# Patient Record
Sex: FEMALE | Race: WHITE | NOT HISPANIC OR LATINO | URBAN - METROPOLITAN AREA
[De-identification: names, ages, dates, MRNs, and addresses within clinical notes are randomized per-mention and may not be internally consistent; named-entity substitution may affect disease eponyms.]

---

## 2023-02-23 LAB
AMPHETAMINE (PRESENCE) IN URINE BY SCREEN METHOD: NORMAL
BARBITURATES PRESENCE IN URINE BY SCREEN METHOD: NORMAL
BENZODIAZEPINE (PRESENCE) IN URINE BY SCREEN METHOD: NORMAL
CANNABINOIDS IN URINE BY SCREEN METHOD: NORMAL
COCAINE (PRESENCE) IN URINE BY SCREEN METHOD: NORMAL
DRUG SCREEN COMMENT URINE: NORMAL
FENTANYL URINE: NORMAL
METHADONE (PRESENCE) IN URINE BY SCREEN METHOD: NORMAL
OPIATES (PRESENCE) IN URINE BY SCREEN METHOD: NORMAL
OXYCODONE (PRESENCE) IN URINE BY SCREEN METHOD: NORMAL
PHENCYCLIDINE (PRESENCE) IN URINE BY SCREEN METHOD: NORMAL

## 2023-04-03 ENCOUNTER — TELEPHONE (OUTPATIENT)
Dept: PRIMARY CARE | Facility: CLINIC | Age: 78
End: 2023-04-03
Payer: MEDICARE

## 2023-04-03 NOTE — TELEPHONE ENCOUNTER
Patient is 10 days out from having covid and is still experiencing coughing up phlegm. She would like to be advised on if she should just ride it out or if she needs to come in to be seen or be sent in medication.    [facility] [phone]    Please call and advise patient [phone]    Left detailed voicemail for patient RMB sent in medication.

## 2023-04-04 DIAGNOSIS — R05.8 UPPER AIRWAY COUGH SYNDROME: Primary | ICD-10-CM

## 2023-04-04 RX ORDER — BENZONATATE 200 MG/1
200 CAPSULE ORAL 3 TIMES DAILY PRN
Qty: 30 CAPSULE | Refills: 0 | Status: SHIPPED | OUTPATIENT
Start: 2023-04-04 | End: 2023-05-04

## 2023-04-04 RX ORDER — ALBUTEROL SULFATE 90 UG/1
1 AEROSOL, METERED RESPIRATORY (INHALATION) EVERY 4 HOURS PRN
Qty: 8.5 G | Refills: 0 | Status: SHIPPED | OUTPATIENT
Start: 2023-04-04 | End: 2023-12-10 | Stop reason: ALTCHOICE

## 2023-05-19 ENCOUNTER — OFFICE VISIT (OUTPATIENT)
Dept: PRIMARY CARE | Facility: CLINIC | Age: 78
End: 2023-05-19
Payer: MEDICARE

## 2023-05-19 ENCOUNTER — LAB (OUTPATIENT)
Dept: LAB | Facility: LAB | Age: 78
End: 2023-05-19
Payer: MEDICARE

## 2023-05-19 VITALS
WEIGHT: 175.6 LBS | HEART RATE: 69 BPM | RESPIRATION RATE: 18 BRPM | OXYGEN SATURATION: 95 % | BODY MASS INDEX: 30.14 KG/M2

## 2023-05-19 DIAGNOSIS — R00.2 PALPITATIONS: ICD-10-CM

## 2023-05-19 DIAGNOSIS — M79.10 MUSCLE ACHE: ICD-10-CM

## 2023-05-19 DIAGNOSIS — Z00.00 ANNUAL PHYSICAL EXAM: Primary | ICD-10-CM

## 2023-05-19 DIAGNOSIS — Z00.00 ANNUAL PHYSICAL EXAM: ICD-10-CM

## 2023-05-19 DIAGNOSIS — R00.2 PALPITATIONS: Primary | ICD-10-CM

## 2023-05-19 PROBLEM — R11.0 NAUSEA: Status: ACTIVE | Noted: 2023-05-19

## 2023-05-19 PROBLEM — I10 HYPERTENSION: Status: ACTIVE | Noted: 2023-05-19

## 2023-05-19 PROBLEM — R10.9 ABDOMINAL PAIN: Status: ACTIVE | Noted: 2023-05-19

## 2023-05-19 PROBLEM — M70.60 TROCHANTERIC BURSITIS: Status: ACTIVE | Noted: 2023-05-19

## 2023-05-19 PROBLEM — R73.9 ELEVATED BLOOD SUGAR: Status: ACTIVE | Noted: 2023-05-19

## 2023-05-19 PROBLEM — T14.8XXA MUSCLE STRAIN: Status: ACTIVE | Noted: 2023-05-19

## 2023-05-19 PROBLEM — N20.0 CALCULUS OF KIDNEY: Status: ACTIVE | Noted: 2023-05-19

## 2023-05-19 PROBLEM — K11.1 PAROTID GLAND ENLARGEMENT: Status: ACTIVE | Noted: 2023-05-19

## 2023-05-19 PROBLEM — M54.16 LUMBAR NEURITIS: Status: ACTIVE | Noted: 2023-05-19

## 2023-05-19 PROBLEM — N39.0 UTI (URINARY TRACT INFECTION): Status: ACTIVE | Noted: 2023-05-19

## 2023-05-19 PROBLEM — R35.0 URINARY FREQUENCY: Status: ACTIVE | Noted: 2023-05-19

## 2023-05-19 PROBLEM — R22.1 NECK SWELLING: Status: ACTIVE | Noted: 2023-05-19

## 2023-05-19 PROBLEM — M85.80 OSTEOPENIA: Status: ACTIVE | Noted: 2023-05-19

## 2023-05-19 PROBLEM — J34.89 NOSE DRYNESS: Status: ACTIVE | Noted: 2023-05-19

## 2023-05-19 PROBLEM — G47.00 INSOMNIA: Status: ACTIVE | Noted: 2023-05-19

## 2023-05-19 PROBLEM — E21.0 PRIMARY HYPERPARATHYROIDISM (MULTI): Status: ACTIVE | Noted: 2023-05-19

## 2023-05-19 PROBLEM — N20.1 URETERAL STONE: Status: ACTIVE | Noted: 2023-05-19

## 2023-05-19 PROBLEM — R05.9 COUGH: Status: ACTIVE | Noted: 2023-05-19

## 2023-05-19 PROBLEM — R04.0 EPISTAXIS: Status: ACTIVE | Noted: 2023-05-19

## 2023-05-19 PROBLEM — L91.8 SKIN TAG: Status: ACTIVE | Noted: 2023-05-19

## 2023-05-19 PROBLEM — N95.2 VAGINAL ATROPHY: Status: ACTIVE | Noted: 2023-05-19

## 2023-05-19 PROBLEM — I49.3 PVC (PREMATURE VENTRICULAR CONTRACTION): Status: ACTIVE | Noted: 2023-05-19

## 2023-05-19 PROBLEM — R07.81 RIB PAIN ON LEFT SIDE: Status: ACTIVE | Noted: 2023-05-19

## 2023-05-19 PROBLEM — J34.89 NASAL DRAINAGE: Status: ACTIVE | Noted: 2023-05-19

## 2023-05-19 PROBLEM — J01.90 ACUTE SINUSITIS: Status: ACTIVE | Noted: 2023-05-19

## 2023-05-19 PROBLEM — R20.0 LEFT LEG NUMBNESS: Status: ACTIVE | Noted: 2023-05-19

## 2023-05-19 PROBLEM — E78.5 HYPERLIPIDEMIA: Status: ACTIVE | Noted: 2023-05-19

## 2023-05-19 PROBLEM — N36.2 URETHRAL CARUNCLE: Status: ACTIVE | Noted: 2023-05-19

## 2023-05-19 PROBLEM — M81.0 OSTEOPOROSIS: Status: ACTIVE | Noted: 2023-05-19

## 2023-05-19 PROBLEM — E83.52 HYPERCALCEMIA: Status: ACTIVE | Noted: 2023-05-19

## 2023-05-19 PROBLEM — K21.9 GERD (GASTROESOPHAGEAL REFLUX DISEASE): Status: ACTIVE | Noted: 2023-05-19

## 2023-05-19 PROBLEM — M54.12 CERVICAL NEURITIS: Status: ACTIVE | Noted: 2023-05-19

## 2023-05-19 PROBLEM — M19.90 OSTEOARTHRITIS: Status: ACTIVE | Noted: 2023-05-19

## 2023-05-19 LAB
ALANINE AMINOTRANSFERASE (SGPT) (U/L) IN SER/PLAS: 16 U/L (ref 7–45)
ALBUMIN (G/DL) IN SER/PLAS: 4.5 G/DL (ref 3.4–5)
ALKALINE PHOSPHATASE (U/L) IN SER/PLAS: 48 U/L (ref 33–136)
ANION GAP IN SER/PLAS: 10 MMOL/L (ref 10–20)
ASPARTATE AMINOTRANSFERASE (SGOT) (U/L) IN SER/PLAS: 17 U/L (ref 9–39)
BASOPHILS (10*3/UL) IN BLOOD BY AUTOMATED COUNT: 0.04 X10E9/L (ref 0–0.1)
BASOPHILS/100 LEUKOCYTES IN BLOOD BY AUTOMATED COUNT: 0.6 % (ref 0–2)
BILIRUBIN TOTAL (MG/DL) IN SER/PLAS: 0.6 MG/DL (ref 0–1.2)
CALCIUM (MG/DL) IN SER/PLAS: 10 MG/DL (ref 8.6–10.3)
CARBON DIOXIDE, TOTAL (MMOL/L) IN SER/PLAS: 27 MMOL/L (ref 21–32)
CHLORIDE (MMOL/L) IN SER/PLAS: 104 MMOL/L (ref 98–107)
CREATINE KINASE (U/L) IN SER/PLAS: 48 U/L (ref 0–215)
CREATININE (MG/DL) IN SER/PLAS: 0.99 MG/DL (ref 0.5–1.05)
EOSINOPHILS (10*3/UL) IN BLOOD BY AUTOMATED COUNT: 0.05 X10E9/L (ref 0–0.4)
EOSINOPHILS/100 LEUKOCYTES IN BLOOD BY AUTOMATED COUNT: 0.8 % (ref 0–6)
ERYTHROCYTE DISTRIBUTION WIDTH (RATIO) BY AUTOMATED COUNT: 11.9 % (ref 11.5–14.5)
ERYTHROCYTE MEAN CORPUSCULAR HEMOGLOBIN CONCENTRATION (G/DL) BY AUTOMATED: 32.5 G/DL (ref 32–36)
ERYTHROCYTE MEAN CORPUSCULAR VOLUME (FL) BY AUTOMATED COUNT: 90 FL (ref 80–100)
ERYTHROCYTES (10*6/UL) IN BLOOD BY AUTOMATED COUNT: 5.31 X10E12/L (ref 4–5.2)
GFR FEMALE: 58 ML/MIN/1.73M2
GLUCOSE (MG/DL) IN SER/PLAS: 89 MG/DL (ref 74–99)
HEMATOCRIT (%) IN BLOOD BY AUTOMATED COUNT: 47.7 % (ref 36–46)
HEMOGLOBIN (G/DL) IN BLOOD: 15.5 G/DL (ref 12–16)
IMMATURE GRANULOCYTES/100 LEUKOCYTES IN BLOOD BY AUTOMATED COUNT: 0.5 % (ref 0–0.9)
LEUKOCYTES (10*3/UL) IN BLOOD BY AUTOMATED COUNT: 6.3 X10E9/L (ref 4.4–11.3)
LYMPHOCYTES (10*3/UL) IN BLOOD BY AUTOMATED COUNT: 1.61 X10E9/L (ref 0.8–3)
LYMPHOCYTES/100 LEUKOCYTES IN BLOOD BY AUTOMATED COUNT: 25.5 % (ref 13–44)
MONOCYTES (10*3/UL) IN BLOOD BY AUTOMATED COUNT: 0.51 X10E9/L (ref 0.05–0.8)
MONOCYTES/100 LEUKOCYTES IN BLOOD BY AUTOMATED COUNT: 8.1 % (ref 2–10)
NEUTROPHILS (10*3/UL) IN BLOOD BY AUTOMATED COUNT: 4.08 X10E9/L (ref 1.6–5.5)
NEUTROPHILS/100 LEUKOCYTES IN BLOOD BY AUTOMATED COUNT: 64.5 % (ref 40–80)
PLATELETS (10*3/UL) IN BLOOD AUTOMATED COUNT: 378 X10E9/L (ref 150–450)
POTASSIUM (MMOL/L) IN SER/PLAS: 4.4 MMOL/L (ref 3.5–5.3)
PROTEIN TOTAL: 7 G/DL (ref 6.4–8.2)
SEDIMENTATION RATE, ERYTHROCYTE: 21 MM/H (ref 0–30)
SODIUM (MMOL/L) IN SER/PLAS: 137 MMOL/L (ref 136–145)
THYROTROPIN (MIU/L) IN SER/PLAS BY DETECTION LIMIT <= 0.05 MIU/L: 1.62 MIU/L (ref 0.44–3.98)
UREA NITROGEN (MG/DL) IN SER/PLAS: 24 MG/DL (ref 6–23)

## 2023-05-19 PROCEDURE — 93000 ELECTROCARDIOGRAM COMPLETE: CPT | Performed by: INTERNAL MEDICINE

## 2023-05-19 PROCEDURE — 1126F AMNT PAIN NOTED NONE PRSNT: CPT | Performed by: INTERNAL MEDICINE

## 2023-05-19 PROCEDURE — 1036F TOBACCO NON-USER: CPT | Performed by: INTERNAL MEDICINE

## 2023-05-19 PROCEDURE — 84443 ASSAY THYROID STIM HORMONE: CPT

## 2023-05-19 PROCEDURE — 36415 COLL VENOUS BLD VENIPUNCTURE: CPT

## 2023-05-19 PROCEDURE — 82550 ASSAY OF CK (CPK): CPT

## 2023-05-19 PROCEDURE — 85652 RBC SED RATE AUTOMATED: CPT

## 2023-05-19 PROCEDURE — 1160F RVW MEDS BY RX/DR IN RCRD: CPT | Performed by: INTERNAL MEDICINE

## 2023-05-19 PROCEDURE — 1159F MED LIST DOCD IN RCRD: CPT | Performed by: INTERNAL MEDICINE

## 2023-05-19 PROCEDURE — 85025 COMPLETE CBC W/AUTO DIFF WBC: CPT

## 2023-05-19 PROCEDURE — 99214 OFFICE O/P EST MOD 30 MIN: CPT | Performed by: INTERNAL MEDICINE

## 2023-05-19 PROCEDURE — 80053 COMPREHEN METABOLIC PANEL: CPT

## 2023-05-19 RX ORDER — AMLODIPINE BESYLATE 5 MG/1
1 TABLET ORAL DAILY
COMMUNITY
Start: 2011-11-28 | End: 2023-10-26 | Stop reason: SDUPTHER

## 2023-05-19 RX ORDER — MELOXICAM 15 MG/1
1 TABLET ORAL DAILY
COMMUNITY
Start: 2012-01-26 | End: 2024-01-11 | Stop reason: WASHOUT

## 2023-05-19 RX ORDER — OMEPRAZOLE 20 MG/1
1 CAPSULE, DELAYED RELEASE ORAL 2 TIMES DAILY
COMMUNITY
Start: 2021-12-06 | End: 2024-01-11 | Stop reason: WASHOUT

## 2023-05-19 RX ORDER — METOPROLOL SUCCINATE 50 MG/1
1 TABLET, EXTENDED RELEASE ORAL DAILY
COMMUNITY
Start: 2011-12-16 | End: 2023-10-26 | Stop reason: SDUPTHER

## 2023-05-19 RX ORDER — ATORVASTATIN CALCIUM 20 MG/1
1 TABLET, FILM COATED ORAL DAILY
COMMUNITY
Start: 2011-09-27 | End: 2023-05-26 | Stop reason: SDUPTHER

## 2023-05-19 RX ORDER — ZOLPIDEM TARTRATE 10 MG/1
TABLET ORAL
COMMUNITY
Start: 2013-03-05 | End: 2023-08-08 | Stop reason: SDUPTHER

## 2023-05-19 RX ORDER — ALENDRONATE SODIUM 70 MG/1
TABLET ORAL
COMMUNITY
Start: 2022-02-03 | End: 2023-10-09

## 2023-05-19 ASSESSMENT — PATIENT HEALTH QUESTIONNAIRE - PHQ9
2. FEELING DOWN, DEPRESSED OR HOPELESS: NOT AT ALL
SUM OF ALL RESPONSES TO PHQ9 QUESTIONS 1 AND 2: 0
1. LITTLE INTEREST OR PLEASURE IN DOING THINGS: NOT AT ALL

## 2023-05-19 ASSESSMENT — ENCOUNTER SYMPTOMS
DEPRESSION: 0
OCCASIONAL FEELINGS OF UNSTEADINESS: 0
LOSS OF SENSATION IN FEET: 0

## 2023-05-19 NOTE — PROGRESS NOTES
Subjective   Patient ID: Yosvany Uriarte is a 77 y.o. female who presents for Palpitations, Arm Pain (Left arm burning), and Chest Pain (pressure).    HPI     Palpitations-previously seen by Dr Shanks in cardiology in 2015- Stress test and Holter were normal.    Intermittent chest pressure    L arm pressure/aching x 2 weeks    Review of Systems      No Fever/chills/headaches/dizziness/ shortness of breath/Nausea/vomiting/diarrhea/ constipation/urine frequency/blood in urine.      Objective   Pulse 69   Resp 18   Wt 79.7 kg (175 lb 9.6 oz)   SpO2 95%   BMI 30.14 kg/m²     Physical Exam    No JVP elevation. No palpable Lymph Nodes. No Thyromegaly    CVS-NL S1/S2 . No MRG    Lungs-CTA. B/S= B/L    Abdomen-Soft, Non-tender. No masses or HSM    Extremities: No C/C/E      Assessment/Plan        Palpitations-previously seen by Dr Shanks in cardiology in 2015- Stress test and Holter were normal.    Intermittent chest pressure    L arm pressure/aching x 2 weeks    Plan:    EKG    Labs    Echo    Zio patch/Monitor    ? Increase Metoprolol from 50-75 mg daily    L arm pressure/aching x 2 weeks    ? Muscular vs Neuropathic    ? Medrol dose Pk    Follow up PRN if symptoms persist or worsen

## 2023-05-26 DIAGNOSIS — E78.5 HYPERLIPIDEMIA, UNSPECIFIED HYPERLIPIDEMIA TYPE: ICD-10-CM

## 2023-05-26 RX ORDER — ATORVASTATIN CALCIUM 20 MG/1
20 TABLET, FILM COATED ORAL DAILY
Qty: 90 TABLET | Refills: 0 | Status: SHIPPED | OUTPATIENT
Start: 2023-05-26 | End: 2023-07-21

## 2023-06-01 ENCOUNTER — LAB (OUTPATIENT)
Dept: LAB | Facility: LAB | Age: 78
End: 2023-06-01
Payer: MEDICARE

## 2023-06-01 DIAGNOSIS — Z00.00 MEDICARE ANNUAL WELLNESS VISIT, SUBSEQUENT: ICD-10-CM

## 2023-06-01 LAB
APPEARANCE, URINE: ABNORMAL
BILIRUBIN, URINE: NEGATIVE
BLOOD, URINE: NEGATIVE
CHOLESTEROL (MG/DL) IN SER/PLAS: 185 MG/DL (ref 0–199)
CHOLESTEROL IN HDL (MG/DL) IN SER/PLAS: 53.5 MG/DL
CHOLESTEROL/HDL RATIO: 3.5
COLOR, URINE: YELLOW
GLUCOSE, URINE: NEGATIVE MG/DL
KETONES, URINE: NEGATIVE MG/DL
LDL: 95 MG/DL (ref 0–99)
LEUKOCYTE ESTERASE, URINE: ABNORMAL
MUCUS, URINE: NORMAL /LPF
NITRITE, URINE: NEGATIVE
PH, URINE: 5 (ref 5–8)
PROTEIN, URINE: NEGATIVE MG/DL
RBC, URINE: <1 /HPF (ref 0–5)
SPECIFIC GRAVITY, URINE: 1.02 (ref 1–1.03)
SQUAMOUS EPITHELIAL CELLS, URINE: <1 /HPF
TRIGLYCERIDE (MG/DL) IN SER/PLAS: 182 MG/DL (ref 0–149)
UROBILINOGEN, URINE: <2 MG/DL (ref 0–1.9)
VLDL: 36 MG/DL (ref 0–40)
WBC, URINE: 1 /HPF (ref 0–5)

## 2023-06-01 PROCEDURE — 80061 LIPID PANEL: CPT

## 2023-06-01 PROCEDURE — 81001 URINALYSIS AUTO W/SCOPE: CPT

## 2023-06-01 PROCEDURE — 36415 COLL VENOUS BLD VENIPUNCTURE: CPT

## 2023-06-08 ENCOUNTER — OFFICE VISIT (OUTPATIENT)
Dept: PRIMARY CARE | Facility: CLINIC | Age: 78
End: 2023-06-08
Payer: MEDICARE

## 2023-06-08 VITALS
RESPIRATION RATE: 18 BRPM | BODY MASS INDEX: 31.25 KG/M2 | SYSTOLIC BLOOD PRESSURE: 154 MMHG | DIASTOLIC BLOOD PRESSURE: 71 MMHG | OXYGEN SATURATION: 96 % | HEART RATE: 73 BPM | WEIGHT: 176.4 LBS | HEIGHT: 63 IN

## 2023-06-08 DIAGNOSIS — R00.2 PALPITATIONS: Primary | ICD-10-CM

## 2023-06-08 DIAGNOSIS — Z00.00 MEDICARE ANNUAL WELLNESS VISIT, SUBSEQUENT: ICD-10-CM

## 2023-06-08 PROCEDURE — 1160F RVW MEDS BY RX/DR IN RCRD: CPT | Performed by: INTERNAL MEDICINE

## 2023-06-08 PROCEDURE — 1036F TOBACCO NON-USER: CPT | Performed by: INTERNAL MEDICINE

## 2023-06-08 PROCEDURE — 3078F DIAST BP <80 MM HG: CPT | Performed by: INTERNAL MEDICINE

## 2023-06-08 PROCEDURE — 1159F MED LIST DOCD IN RCRD: CPT | Performed by: INTERNAL MEDICINE

## 2023-06-08 PROCEDURE — G0439 PPPS, SUBSEQ VISIT: HCPCS | Performed by: INTERNAL MEDICINE

## 2023-06-08 PROCEDURE — 3077F SYST BP >= 140 MM HG: CPT | Performed by: INTERNAL MEDICINE

## 2023-06-08 RX ORDER — VITAMIN E 268 MG
CAPSULE ORAL
COMMUNITY
Start: 2003-05-19

## 2023-06-08 RX ORDER — MULTIVITAMIN
TABLET ORAL
COMMUNITY
Start: 2003-05-19

## 2023-06-08 ASSESSMENT — ENCOUNTER SYMPTOMS
LOSS OF SENSATION IN FEET: 0
DEPRESSION: 0
OCCASIONAL FEELINGS OF UNSTEADINESS: 0

## 2023-06-08 NOTE — PROGRESS NOTES
"Subjective   Reason for Visit: Yosvany Uriarte is an 77 y.o. female here for a Medicare Wellness visit.          Reviewed all medications by prescribing practitioner or clinical pharmacist (such as prescriptions, OTCs, herbal therapies and supplements) and documented in the medical record.    HTN    HLD    C/o palpitations- Echo completed. Holter in progress    HPI    Patient Care Team:  Samuel Paul MD as PCP - General  Samuel Paul MD as PCP - [employer] PCP     Review of Systems    + Palpitations      No Fever/chills/headaches/dizziness/chest pains/ shortness of breath//Nausea/vomiting/diarrhea/ constipation/urine frequency/blood in urine.        Objective   Vitals:  /71 (BP Location: Left arm, Patient Position: Sitting, BP Cuff Size: Adult)   Pulse 73   Resp 18   Ht 1.605 m (5' 3.19\")   Wt 80 kg (176 lb 6.4 oz)   SpO2 96%   BMI 31.06 kg/m²       Physical Exam    No JVP elevation. No palpable Lymph Nodes. No Thyromegaly    CVS-NL S1/S2 . No MRG    Lungs-CTA. B/S= B/L    Abdomen-Soft, Non-tender. No masses or HSM    Extremities: No C/C/E      Assessment/Plan   Problem List Items Addressed This Visit    None  Visit Diagnoses       Medicare annual wellness visit, subsequent        Relevant Orders    Lipid Panel (Completed)    Urinalysis with Reflex Microscopic (Completed)            HTN    HLD    C/o palpitations- Echo completed. Holter in progress    Plan:     Hold Amlodipine 5 mg ( 5% may cause palpitations) x 7-10 days    Labs    Follow up if symptoms persist/worsen     "

## 2023-07-07 ENCOUNTER — TELEPHONE (OUTPATIENT)
Dept: PRIMARY CARE | Facility: CLINIC | Age: 78
End: 2023-07-07
Payer: MEDICARE

## 2023-07-07 DIAGNOSIS — R00.2 PALPITATIONS: Primary | ICD-10-CM

## 2023-07-07 NOTE — TELEPHONE ENCOUNTER
Pt is requesting a referral to cardiology for palpitations. Echo with diastolic dysfunction and mild AVR. Prior Holter monitor in 2015 -no arrhythmia

## 2023-07-10 ENCOUNTER — TELEPHONE (OUTPATIENT)
Dept: PRIMARY CARE | Facility: CLINIC | Age: 78
End: 2023-07-10
Payer: MEDICARE

## 2023-07-10 NOTE — TELEPHONE ENCOUNTER
Spoke with patient and informed her of [facility] message about her results and that he put the referral in the system. Patient was in full understanding and had no other questions or concerns. Patient was identified with full name and date of birth.       Margarita Ruelas MA

## 2023-07-20 DIAGNOSIS — E78.5 HYPERLIPIDEMIA, UNSPECIFIED HYPERLIPIDEMIA TYPE: ICD-10-CM

## 2023-07-21 RX ORDER — ATORVASTATIN CALCIUM 20 MG/1
20 TABLET, FILM COATED ORAL DAILY
Qty: 90 TABLET | Refills: 3 | Status: SHIPPED | OUTPATIENT
Start: 2023-07-21 | End: 2024-05-20 | Stop reason: SDUPTHER

## 2023-08-08 DIAGNOSIS — G47.00 INSOMNIA, UNSPECIFIED TYPE: ICD-10-CM

## 2023-08-08 RX ORDER — ZOLPIDEM TARTRATE 10 MG/1
10 TABLET ORAL NIGHTLY PRN
Qty: 30 TABLET | Refills: 2 | Status: SHIPPED | OUTPATIENT
Start: 2023-08-08 | End: 2023-10-26 | Stop reason: SDUPTHER

## 2023-08-29 ENCOUNTER — TELEPHONE (OUTPATIENT)
Dept: PRIMARY CARE | Facility: CLINIC | Age: 78
End: 2023-08-29
Payer: MEDICARE

## 2023-08-30 NOTE — TELEPHONE ENCOUNTER
Called patient and informed her that [facility] is recommending Dr. Mony Jade or one of her associates at [phone]. She was in full understanding and had no other questions or concerns.       Margarita Ruelas MA

## 2023-09-07 NOTE — TELEPHONE ENCOUNTER
PT called in and stated that the therapist  for family counceling (Dr. Jade) that she was referred to is not accepting new patients. PT needs a new referral for a different therapist

## 2023-09-07 NOTE — TELEPHONE ENCOUNTER
Called patient and left a voice mail informing her that [facility] does not have any other recommendations and said that her associates are just as good.      Margarita Ruelas MA

## 2023-10-04 DIAGNOSIS — M81.0 AGE RELATED OSTEOPOROSIS, UNSPECIFIED PATHOLOGICAL FRACTURE PRESENCE: Primary | ICD-10-CM

## 2023-10-06 PROBLEM — E83.52 SERUM CALCIUM ELEVATED: Status: ACTIVE | Noted: 2023-10-06

## 2023-10-06 PROBLEM — S52.502A DISTAL RADIUS FRACTURE, LEFT: Status: ACTIVE | Noted: 2023-10-06

## 2023-10-06 RX ORDER — METHYLPREDNISOLONE 4 MG/1
TABLET ORAL
COMMUNITY
Start: 2021-03-22 | End: 2023-12-10 | Stop reason: ALTCHOICE

## 2023-10-06 RX ORDER — MELOXICAM 7.5 MG/1
1 TABLET ORAL DAILY
COMMUNITY
Start: 2009-03-05

## 2023-10-06 RX ORDER — ASPIRIN 81 MG/1
1 TABLET ORAL DAILY
COMMUNITY
End: 2023-12-10 | Stop reason: ALTCHOICE

## 2023-10-06 RX ORDER — AMLODIPINE BESYLATE 10 MG/1
1 TABLET ORAL DAILY
COMMUNITY
Start: 2009-03-05 | End: 2024-03-28 | Stop reason: SDUPTHER

## 2023-10-06 RX ORDER — RALOXIFENE HYDROCHLORIDE 60 MG/1
TABLET, FILM COATED ORAL EVERY OTHER DAY
COMMUNITY
Start: 2003-05-19 | End: 2023-12-10 | Stop reason: ALTCHOICE

## 2023-10-06 RX ORDER — ATORVASTATIN CALCIUM 10 MG/1
1 TABLET, FILM COATED ORAL DAILY
COMMUNITY
Start: 2009-03-05 | End: 2024-01-11 | Stop reason: WASHOUT

## 2023-10-09 RX ORDER — ALENDRONATE SODIUM 70 MG/1
TABLET ORAL
Qty: 12 TABLET | Refills: 3 | Status: SHIPPED | OUTPATIENT
Start: 2023-10-09 | End: 2023-10-11 | Stop reason: SDUPTHER

## 2023-10-11 ENCOUNTER — HOSPITAL ENCOUNTER (OUTPATIENT)
Dept: RADIOLOGY | Facility: HOSPITAL | Age: 78
Discharge: HOME | End: 2023-10-11
Payer: MEDICARE

## 2023-10-11 ENCOUNTER — OFFICE VISIT (OUTPATIENT)
Dept: ORTHOPEDIC SURGERY | Facility: HOSPITAL | Age: 78
End: 2023-10-11
Payer: MEDICARE

## 2023-10-11 DIAGNOSIS — M81.0 AGE RELATED OSTEOPOROSIS, UNSPECIFIED PATHOLOGICAL FRACTURE PRESENCE: ICD-10-CM

## 2023-10-11 DIAGNOSIS — S52.592D OTHER CLOSED FRACTURE OF DISTAL END OF LEFT RADIUS WITH ROUTINE HEALING, SUBSEQUENT ENCOUNTER: ICD-10-CM

## 2023-10-11 DIAGNOSIS — S52.592D OTHER CLOSED FRACTURE OF DISTAL END OF LEFT RADIUS WITH ROUTINE HEALING, SUBSEQUENT ENCOUNTER: Primary | ICD-10-CM

## 2023-10-11 PROCEDURE — 99213 OFFICE O/P EST LOW 20 MIN: CPT | Performed by: PHYSICIAN ASSISTANT

## 2023-10-11 PROCEDURE — 73110 X-RAY EXAM OF WRIST: CPT | Mod: LEFT SIDE | Performed by: STUDENT IN AN ORGANIZED HEALTH CARE EDUCATION/TRAINING PROGRAM

## 2023-10-11 PROCEDURE — 73110 X-RAY EXAM OF WRIST: CPT | Mod: LT,FY

## 2023-10-11 PROCEDURE — 1160F RVW MEDS BY RX/DR IN RCRD: CPT | Performed by: PHYSICIAN ASSISTANT

## 2023-10-11 PROCEDURE — 1126F AMNT PAIN NOTED NONE PRSNT: CPT | Performed by: PHYSICIAN ASSISTANT

## 2023-10-11 PROCEDURE — 1036F TOBACCO NON-USER: CPT | Performed by: PHYSICIAN ASSISTANT

## 2023-10-11 PROCEDURE — 1159F MED LIST DOCD IN RCRD: CPT | Performed by: PHYSICIAN ASSISTANT

## 2023-10-12 RX ORDER — ALENDRONATE SODIUM 70 MG/1
70 TABLET ORAL
Qty: 12 TABLET | Refills: 3 | Status: SHIPPED | OUTPATIENT
Start: 2023-10-12

## 2023-10-12 NOTE — PROGRESS NOTES
Yosvany returns to clinic today for follow-up of left distal radius fracture that occurred on 9/14/2023.  Overall she is doing well having some mild/moderate amount of pain with certain movements.    Past medical history, medications, allergies, surgical history and review of systems have been reviewed with the patient. Pertinent changes are documented in the HPI. Otherwise they are unchanged when compared to last visit on 9/28/2023.    Physical Examination Findings:  Constitutional: Appears well-developed and well-nourished.  Head: Normocephalic and atraumatic.  Eyes: Pupils are equal and round.  Cardiovascular: Intact distal pulses.   Respiratory: Effort normal. No respiratory distress.  Neurologic: Alert and oriented to person, place, and time.  Skin: Skin is warm and dry.  Hematologic / Lymphatic: No lymphedema, lymphangitis.  Psychiatric: normal mood and affect. Behavior is normal.   Musculoskeletal: Left wrist with significant visible swelling.  Full digital finger range of motion.  Good forearm rotation.    New x-rays taken today of the left wrist reveal good maintained anatomic alignment, evidence of new bridging callus formation seen    Impression: Left distal radius fracture    Plan: At this time we discussed transition back into her removable brace.  She may come out of this brace for light range of motion activity.  She will avoid any heavy lifting or weightbearing with the left upper extremity.  She will return to our office in approximately 4 weeks for repeat x-ray of the left wrist.    Mary Carmen Mendiola PA-C  Department of Orthopaedic Surgery  [facility]    Dictation performed with the use of voice recognition software. Syntax and grammatical errors may exist.

## 2023-10-26 ENCOUNTER — ANCILLARY PROCEDURE (OUTPATIENT)
Dept: RADIOLOGY | Facility: CLINIC | Age: 78
End: 2023-10-26
Payer: MEDICARE

## 2023-10-26 DIAGNOSIS — R73.9 ELEVATED BLOOD SUGAR: ICD-10-CM

## 2023-10-26 DIAGNOSIS — I10 HYPERTENSION, UNSPECIFIED TYPE: ICD-10-CM

## 2023-10-26 DIAGNOSIS — G47.00 INSOMNIA, UNSPECIFIED TYPE: ICD-10-CM

## 2023-10-26 DIAGNOSIS — E78.5 HYPERLIPIDEMIA, UNSPECIFIED: ICD-10-CM

## 2023-10-26 PROCEDURE — 75571 CT HRT W/O DYE W/CA TEST: CPT

## 2023-10-26 RX ORDER — AMLODIPINE BESYLATE 5 MG/1
5 TABLET ORAL DAILY
Qty: 90 TABLET | Refills: 3 | Status: SHIPPED | OUTPATIENT
Start: 2023-10-26 | End: 2023-12-10 | Stop reason: ALTCHOICE

## 2023-10-26 RX ORDER — ZOLPIDEM TARTRATE 10 MG/1
10 TABLET ORAL NIGHTLY PRN
Qty: 90 TABLET | Refills: 1 | Status: SHIPPED | OUTPATIENT
Start: 2023-10-26 | End: 2024-03-12 | Stop reason: SDUPTHER

## 2023-10-26 RX ORDER — METOPROLOL SUCCINATE 50 MG/1
50 TABLET, EXTENDED RELEASE ORAL DAILY
Qty: 90 TABLET | Refills: 3 | Status: SHIPPED | OUTPATIENT
Start: 2023-10-26 | End: 2024-10-25

## 2023-11-08 DIAGNOSIS — S52.592D OTHER CLOSED FRACTURE OF DISTAL END OF LEFT RADIUS WITH ROUTINE HEALING, SUBSEQUENT ENCOUNTER: Primary | ICD-10-CM

## 2023-11-09 ENCOUNTER — OFFICE VISIT (OUTPATIENT)
Dept: ORTHOPEDIC SURGERY | Facility: CLINIC | Age: 78
End: 2023-11-09
Payer: MEDICARE

## 2023-11-09 ENCOUNTER — ANCILLARY PROCEDURE (OUTPATIENT)
Dept: RADIOLOGY | Facility: CLINIC | Age: 78
End: 2023-11-09
Payer: MEDICARE

## 2023-11-09 DIAGNOSIS — S52.592D OTHER CLOSED FRACTURE OF DISTAL END OF LEFT RADIUS WITH ROUTINE HEALING, SUBSEQUENT ENCOUNTER: ICD-10-CM

## 2023-11-09 DIAGNOSIS — S52.592D OTHER CLOSED FRACTURE OF DISTAL END OF LEFT RADIUS WITH ROUTINE HEALING, SUBSEQUENT ENCOUNTER: Primary | ICD-10-CM

## 2023-11-09 PROCEDURE — 73110 X-RAY EXAM OF WRIST: CPT | Mod: LT,FY

## 2023-11-09 PROCEDURE — 3077F SYST BP >= 140 MM HG: CPT | Performed by: PHYSICIAN ASSISTANT

## 2023-11-09 PROCEDURE — 99213 OFFICE O/P EST LOW 20 MIN: CPT | Performed by: PHYSICIAN ASSISTANT

## 2023-11-09 PROCEDURE — 3079F DIAST BP 80-89 MM HG: CPT | Performed by: PHYSICIAN ASSISTANT

## 2023-11-09 PROCEDURE — 1126F AMNT PAIN NOTED NONE PRSNT: CPT | Performed by: PHYSICIAN ASSISTANT

## 2023-11-09 PROCEDURE — 1160F RVW MEDS BY RX/DR IN RCRD: CPT | Performed by: PHYSICIAN ASSISTANT

## 2023-11-09 PROCEDURE — 1036F TOBACCO NON-USER: CPT | Performed by: PHYSICIAN ASSISTANT

## 2023-11-09 PROCEDURE — 73110 X-RAY EXAM OF WRIST: CPT | Mod: LEFT SIDE | Performed by: RADIOLOGY

## 2023-11-09 PROCEDURE — 1159F MED LIST DOCD IN RCRD: CPT | Performed by: PHYSICIAN ASSISTANT

## 2023-11-09 ASSESSMENT — PAIN - FUNCTIONAL ASSESSMENT: PAIN_FUNCTIONAL_ASSESSMENT: NO/DENIES PAIN

## 2023-11-09 NOTE — PROGRESS NOTES
Yosvany returns to clinic today for follow-up of left distal radius fracture that occurred on 9/14/2023.  Overall she is doing well without significant discomfort.    Past medical history, medications, allergies, surgical history and review of systems have been reviewed with the patient. Pertinent changes are documented in the HPI. Otherwise they are unchanged when compared to last visit     Physical Examination Findings:  Constitutional: Appears well-developed and well-nourished.  Head: Normocephalic and atraumatic.  Eyes: Pupils are equal and round.  Cardiovascular: Intact distal pulses.   Respiratory: Effort normal. No respiratory distress.  Neurologic: Alert and oriented to person, place, and time.  Skin: Skin is warm and dry.  Hematologic / Lymphatic: No lymphedema, lymphangitis.  Psychiatric: normal mood and affect. Behavior is normal.   Musculoskeletal: Left wrist without significant visible swelling.  Full digital finger range of motion.  Good forearm rotation.  Full wrist flexion and extension compared to contralateral side.  Very minimal tenderness patient of the distal radius    New x-rays taken today of the left wrist reveal good maintained near anatomic alignment, significant bridging callus formation visible.    Impression: Left distal radius fracture    Plan: Overall her x-rays look excellent we will allow her to slowly start to progress her activity and return back to normal activity as she tolerates.  She may discontinue use of the brace as she tolerates this well.  We will allow her to follow-up with our office as needed.    Mary Carmen Mendiola PA-C  Department of Orthopaedic Surgery  [facility]    Dictation performed with the use of voice recognition software. Syntax and grammatical errors may exist.

## 2023-11-30 ENCOUNTER — OFFICE VISIT (OUTPATIENT)
Dept: PRIMARY CARE | Facility: CLINIC | Age: 78
End: 2023-11-30
Payer: MEDICARE

## 2023-11-30 DIAGNOSIS — Z23 ENCOUNTER FOR IMMUNIZATION: Primary | ICD-10-CM

## 2023-11-30 PROCEDURE — 90677 PCV20 VACCINE IM: CPT | Performed by: INTERNAL MEDICINE

## 2023-11-30 PROCEDURE — 1036F TOBACCO NON-USER: CPT | Performed by: INTERNAL MEDICINE

## 2023-11-30 PROCEDURE — G0009 ADMIN PNEUMOCOCCAL VACCINE: HCPCS | Performed by: INTERNAL MEDICINE

## 2023-11-30 PROCEDURE — 1160F RVW MEDS BY RX/DR IN RCRD: CPT | Performed by: INTERNAL MEDICINE

## 2023-11-30 PROCEDURE — 1159F MED LIST DOCD IN RCRD: CPT | Performed by: INTERNAL MEDICINE

## 2023-11-30 PROCEDURE — 1126F AMNT PAIN NOTED NONE PRSNT: CPT | Performed by: INTERNAL MEDICINE

## 2024-01-06 ENCOUNTER — PATIENT MESSAGE (OUTPATIENT)
Dept: ORTHOPEDIC SURGERY | Facility: CLINIC | Age: 79
End: 2024-01-06

## 2024-01-08 ENCOUNTER — OFFICE VISIT (OUTPATIENT)
Dept: OBSTETRICS AND GYNECOLOGY | Facility: CLINIC | Age: 79
End: 2024-01-08
Payer: MEDICARE

## 2024-01-08 VITALS — WEIGHT: 182 LBS | DIASTOLIC BLOOD PRESSURE: 73 MMHG | BODY MASS INDEX: 31.24 KG/M2 | SYSTOLIC BLOOD PRESSURE: 199 MMHG

## 2024-01-08 DIAGNOSIS — N39.9 URINARY DISORDER: Primary | ICD-10-CM

## 2024-01-08 DIAGNOSIS — Z12.31 VISIT FOR SCREENING MAMMOGRAM: ICD-10-CM

## 2024-01-08 DIAGNOSIS — S52.592D OTHER CLOSED FRACTURE OF DISTAL END OF LEFT RADIUS WITH ROUTINE HEALING, SUBSEQUENT ENCOUNTER: Primary | ICD-10-CM

## 2024-01-08 PROCEDURE — 99213 OFFICE O/P EST LOW 20 MIN: CPT | Performed by: OBSTETRICS & GYNECOLOGY

## 2024-01-08 PROCEDURE — 3078F DIAST BP <80 MM HG: CPT | Performed by: OBSTETRICS & GYNECOLOGY

## 2024-01-08 PROCEDURE — 1160F RVW MEDS BY RX/DR IN RCRD: CPT | Performed by: OBSTETRICS & GYNECOLOGY

## 2024-01-08 PROCEDURE — 1159F MED LIST DOCD IN RCRD: CPT | Performed by: OBSTETRICS & GYNECOLOGY

## 2024-01-08 PROCEDURE — 3077F SYST BP >= 140 MM HG: CPT | Performed by: OBSTETRICS & GYNECOLOGY

## 2024-01-08 PROCEDURE — 1036F TOBACCO NON-USER: CPT | Performed by: OBSTETRICS & GYNECOLOGY

## 2024-01-08 PROCEDURE — 1126F AMNT PAIN NOTED NONE PRSNT: CPT | Performed by: OBSTETRICS & GYNECOLOGY

## 2024-01-08 ASSESSMENT — ENCOUNTER SYMPTOMS
MUSCULOSKELETAL NEGATIVE: 1
CARDIOVASCULAR NEGATIVE: 1
CONSTITUTIONAL NEGATIVE: 1
GASTROINTESTINAL NEGATIVE: 1
EYES NEGATIVE: 1
NEUROLOGICAL NEGATIVE: 1
PSYCHIATRIC NEGATIVE: 1
ENDOCRINE NEGATIVE: 1
RESPIRATORY NEGATIVE: 1

## 2024-01-08 ASSESSMENT — PAIN SCALES - GENERAL: PAINLEVEL: 0-NO PAIN

## 2024-01-09 ENCOUNTER — ANCILLARY PROCEDURE (OUTPATIENT)
Dept: RADIOLOGY | Facility: CLINIC | Age: 79
End: 2024-01-09
Payer: MEDICARE

## 2024-01-09 DIAGNOSIS — S52.592D OTHER CLOSED FRACTURE OF DISTAL END OF LEFT RADIUS WITH ROUTINE HEALING, SUBSEQUENT ENCOUNTER: ICD-10-CM

## 2024-01-09 PROCEDURE — 73110 X-RAY EXAM OF WRIST: CPT | Mod: LEFT SIDE | Performed by: RADIOLOGY

## 2024-01-09 PROCEDURE — 73110 X-RAY EXAM OF WRIST: CPT | Mod: LT

## 2024-01-09 NOTE — PROGRESS NOTES
Urogynecology  Provider:  Christen Burnett MD  [phone]      ASSESSMENT AND PLAN:   78 year old female presenting for a well woman visit, urethral caruncle, and routine breast cancer screening. Comorbidities include: HTN, GERD, and hyperparathyroidism.     Diagnoses:  #1 Breast cancer screening  #2 Health maintenance, well woman visit   #3 Urethral caruncle     Plan:  1. Breast cancer screening, well woman visit   - Normal breast and pelvic exam with unremarkable findings.   - Bilateral mammogram with tomosynthesis requisition sent today due by 3/8/2025.     2. Elevated blood pressure, HTN  - Her BP was 199/73 and repeat BP in the supine position was 162/74. She was reassured about this.   - Patient may consider follow up with her PCP to discuss antihypertensive medication management with medication and dietary changes/less sodium intake.     3. Urethral caruncle  - Her urethral caruncle is minimal in size (2mm) and not bothersome.     Follow up in 1 year with Dr. Burnett.     Scribe Attestation  By signing my name below, I, Antione Lockhart, Xavieribe, attest that this documentation has been prepared under the direction and in the presence of Christen Burnett MD on 01/08/2024 at 9:50 PM.     I Dr. Burnett, personally performed the services described in the documentation which was scribed virtually and confirm it is both complete and accurate.  Christen Burnett MD        Problem List Items Addressed This Visit    None  Visit Diagnoses       Urinary disorder    -  Primary    Relevant Orders    Measure post void residual    POCT UA Automated manually resulted    Visit for screening mammogram        Relevant Orders    BI mammo bilateral screening tomosynthesis               I spent a total of eConsult Time: 25 minutes in face to face and non face to face time.        Christen Burnett MD      HISTORY OF PRESENT ILLNESS:   78 year old female presenting for an annual well woman visit.     Record Review:   - Last  seen 6/9/2022  Diagnoses:   #1 Urethral caruncle  #2 Breast cancer screening     Plan:   1. Urethral caruncle  - Urethral prolapse is 3 mm in size but is not bothersome. Will plan to monitor sx.   - Not using tv estrogen cream at this time. Declines as not bothered by caruncle     2. Breast cancer screening  - Normal breast exam with no newly reported issues.   - Ordered mammogram w/Tomosynthesis.      Follow-up in 1 year with Dr. Burnett for an annual visit.      Antione PAT, am scribing for virtually, and in the presence of Dr. Christen Burnett on 06/09/2022 at 10:21 AM.     doing well with no issues  f/u in 1 year      OBGYN Symptoms:   - Denies experiencing any breast or vaginal issues.  - Last MMG on 6/27/2022: BI-RADS category 1.     Past Medical History:   Past Medical History:   Diagnosis Date    Personal history of other diseases of the circulatory system     History of hypertension    Personal history of other diseases of the musculoskeletal system and connective tissue     History of arthritis       Past Surgical History:     Past Surgical History:   Procedure Laterality Date    OTHER SURGICAL HISTORY  10/12/2016    Parathyroid Resection    OTHER SURGICAL HISTORY  11/05/2020    Oophorectomy    OTHER SURGICAL HISTORY  06/26/2019    Colonoscopy    TUBAL LIGATION  08/13/2013    Tubal Ligation       Medications:     Prior to Admission medications    Medication Sig Start Date End Date Taking? Authorizing Provider   alendronate (Fosamax) 70 mg tablet Take 1 tablet (70 mg) by mouth every 7 days. Take in the morning with a full glass of water, on an empty stomach, and do not take anything else by mouth or lie down for the next 30 min. 10/12/23   Christen Burnett MD   alpha tocopherol (Vitamin E) 268 mg (400 unit) capsule qd 5/19/03   Historical Provider, MD   amLODIPine (Norvasc) 10 mg tablet Take 1 tablet (10 mg) by mouth once daily. 3/5/09   Historical Provider, MD   ascorbic acid (VITAMIN C ORAL) Take  by mouth once daily. 500mg caplet 5/19/03   Historical Provider, MD   aspirin 81 mg capsule Take 1 tablet by mouth once daily. 1/28/15   Historical Provider, MD   atorvastatin (Lipitor) 10 mg tablet Take 1 tablet (10 mg) by mouth once daily. 3/5/09   Historical Provider, MD   atorvastatin (Lipitor) 20 mg tablet TAKE 1 TABLET BY MOUTH ONCE  DAILY 7/21/23   Samuel Paul MD   meloxicam (Mobic) 15 mg tablet Take 1 tablet (15 mg) by mouth once daily. 1/26/12   Historical Provider, MD   meloxicam (Mobic) 7.5 mg tablet Take 1 tablet (7.5 mg) by mouth once daily. 3/5/09   Historical Provider, MD   metoprolol succinate XL (Toprol-XL) 50 mg 24 hr tablet Take 1 tablet (50 mg) by mouth once daily. 10/26/23 10/25/24  Samuel Paul MD   multivitamin tablet qd 5/19/03   Historical Provider, MD   omeprazole (PriLOSEC) 20 mg DR capsule Take 1 capsule (20 mg) by mouth 2 times a day. 12/6/21   Historical Provider, MD   zolpidem (Ambien) 10 mg tablet Take 1 tablet (10 mg) by mouth as needed at bedtime for sleep. 10/26/23 4/23/24  Samuel Paul MD        ROS  Review of Systems   Constitutional: Negative.    HENT: Negative.     Eyes: Negative.    Respiratory: Negative.     Cardiovascular: Negative.    Gastrointestinal: Negative.    Endocrine: Negative.    Musculoskeletal: Negative.    Neurological: Negative.    Psychiatric/Behavioral: Negative.          Blood, Urine   Date Value Ref Range Status   06/01/2023 NEGATIVE NEGATIVE Final     Nitrite, Urine   Date Value Ref Range Status   06/01/2023 NEGATIVE NEGATIVE Final     Urobilinogen, Urine   Date Value Ref Range Status   06/01/2023 <2.0 0.0 - 1.9 mg/dL Final         PHYSICAL EXAM:    BP (!) 199/73   Wt 82.6 kg (182 lb)   BMI 31.24 kg/m²      No LMP recorded.      Declines chaperone for physical exam.    GENERAL:   Well developed, well nourished, in no apparent distress.   Neurologic/Psychiatric:  Awake, Alert and Oriented times 3.  Affect normal.   Abdomen: Small, soft,  "nontender, nondistended.   Breast: No masses, no nipple discharge, and no skin dimpling bilaterally.     GENITAL/URINARY:     External Genitalia:  The patient has normal appearing external genitalia, normal skenes and bartholins glands, and a normal hair distribution.  Her vulva is without lesions, erythema or discharge.  It is non-tender with appropriate sensation.     Urethral Meatus:  Size normal, Location normal, Lesions absent, small urethral caruncle 2mm in dimension.    Urethra:  Fullness absent, Masses absent,      Bladder:  Fullness absent, Masses absent, Tenderness absent,      Vagina:  General appearance normal, Estrogen effect normal, Discharge absent, Lesions absent. No prolapse.     Cervix: Normal, no discharge.   Uterus:  normal size, mobile, and nontender  Adnexa: normal, no masses or tenderness bilaterally      Anus/Perineum: Normal Perineum    Stress urinary incontinence not demonstrable.         POP-Q:  Stage: 0  Position: sitting    Rectal: Normal resting tone, normal sphincter tone, and good puborectalis lift.     Data and DIAGNOSTIC STUDIES REVIEWED   No results found.   No results found for: \"URINECULTURE\", \"UAMICCOMM\"   Lab Results   Component Value Date    GLUCOSE 89 05/19/2023    CALCIUM 10.0 05/19/2023     05/19/2023    K 4.4 05/19/2023    CO2 27 05/19/2023     05/19/2023    BUN 24 (H) 05/19/2023    CREATININE 0.99 05/19/2023     Lab Results   Component Value Date    WBC 6.3 05/19/2023    HGB 15.5 05/19/2023    HCT 47.7 (H) 05/19/2023    MCV 90 05/19/2023     05/19/2023        Christen Burnett MD  "

## 2024-01-22 ENCOUNTER — EVALUATION (OUTPATIENT)
Dept: OCCUPATIONAL THERAPY | Facility: CLINIC | Age: 79
End: 2024-01-22
Payer: MEDICARE

## 2024-01-22 DIAGNOSIS — S52.592D OTHER CLOSED FRACTURE OF DISTAL END OF LEFT RADIUS WITH ROUTINE HEALING, SUBSEQUENT ENCOUNTER: ICD-10-CM

## 2024-01-22 PROCEDURE — 97110 THERAPEUTIC EXERCISES: CPT | Mod: GO | Performed by: OCCUPATIONAL THERAPIST

## 2024-01-22 PROCEDURE — 97165 OT EVAL LOW COMPLEX 30 MIN: CPT | Mod: GO | Performed by: OCCUPATIONAL THERAPIST

## 2024-01-22 NOTE — PROGRESS NOTES
Occupational Therapy  Occupational Therapy Orthopedic Evaluation    Patient Name: Yosvany Uriarte  MRN: [id number]  Today's Date: 1/23/2024  Time Calculation  Start Time: 0400  Stop Time: 0440  Time Calculation (min): 40 min    Insurance:  Visit number: 1 [facility]  Authorization info: none required  Medicare certification  -  Start: 1/22/24   End: 3/22/24    Current Problem  1. Other closed fracture of distal end of left radius with routine healing, subsequent encounter  Referral to Occupational Therapy    Follow Up In Occupational Therapy          Referred by: Mary Carmen Mendiola PA-C  Referred for:  L distal radius fx  Onset/DOI: 9/14/23    Fall risk: Low  Precautions: None     Medical History Form: Reviewed (scanned into chart)    Subjective   Chief Complaint: L wrist fracture    Hand Dominance: Right    Pain:   3-4  Location: L dorsal   Description: aching  Aggravating Factors:  gripping and lifting  Relieving Factors: rest    Previous Interventions/Treatments: None    Prior Level of Function (PLOF)  Patient previously independent with all ADLs/IADLs    ADL/IADL impairments  Meal prep and Work    Patients Living Environment: Reviewed and no concern  Lives with:   Primary Language: English  Patient's Goal(s) for Therapy: increase strength    Red Flags: Do you have any of the following? No  Fever/chills, unexplained weight changes, dizziness/fainting, unexplained change in bowel or bladder functions, unexplained malaise or muscle weakness, night pain/sweats, numbness or tingling    Objective     WRIST AROM (Degrees)   R L   Extension WNL 55 (A)    70 (P)   Flexion WNL 50 (A)    65 (P)     HAND STRENGTH (Lbs)   R L   Dynamometer  40lb 28lb       Outcome Measures:  QUICK DASH: 20.45%    Treatments:  Exercise Sets/Reps Comments   Prayer stretch     Wrist flexion stretch     Finger ADD with red putty     Composite fist rest putty                        TREATMENT TODAY:       - OT eval completed       - Therapeutic ex:   demonstration and explanation of wrist stretches and intrinsic strengthening with putty x 10min    EDUCATION: Home exercise program, plan of care, activity modifications, joint protection, pain management, and injury pathology       Assessment:   Pt is 78 year old female  who fell in September 2023 which resulted in a L distal radius fracture treated non-surgically and is presenting today for evaluation with complaints of decreased strength, decreased ROM, increased pain.  As a result of these impairments pt is having difficulty with heavy home management and meal prep tasks.  Without skilled intervention patient is at risk for further loss of ROM, loss of strength, reduced IADL function, and is at risk for requiring caregiver assistance for IADL completion.  Patient to benefit from skilled services to address the impairments found in order to return to independent prior level of function.      Plan:     Planned Interventions include: therapeutic exercise, self-care home management, manual therapy, therapeutic activities, , neuromuscular coordination, vasopneumatic, dry needling, and orthosis fabrication.  Frequency: 1 x Week  Duration: 8 Weeks    Goals: Set and discussed today    1) Pt will demo age normative L  strength in order to open jars for meal prep without compensatory techniques, in 6 weeks.  2) Pt well demo at least 10 degrees improved L wrist extension for completion of work tasks, in 4 weeks.  3) Pt will correctly return demo of entire HEP to ensure proper carryover to home, in 2 weeks    Plan of care was developed with input and agreement by the patient      Harsha Alvarez OT

## 2024-01-24 ENCOUNTER — HOSPITAL ENCOUNTER (OUTPATIENT)
Dept: RADIOLOGY | Facility: CLINIC | Age: 79
Discharge: HOME | End: 2024-01-24
Payer: MEDICARE

## 2024-01-24 VITALS — WEIGHT: 182.1 LBS | BODY MASS INDEX: 31.09 KG/M2 | HEIGHT: 64 IN

## 2024-01-24 DIAGNOSIS — Z12.31 VISIT FOR SCREENING MAMMOGRAM: ICD-10-CM

## 2024-01-24 PROCEDURE — 77063 BREAST TOMOSYNTHESIS BI: CPT | Performed by: STUDENT IN AN ORGANIZED HEALTH CARE EDUCATION/TRAINING PROGRAM

## 2024-01-24 PROCEDURE — 77067 SCR MAMMO BI INCL CAD: CPT

## 2024-01-24 PROCEDURE — 77067 SCR MAMMO BI INCL CAD: CPT | Performed by: STUDENT IN AN ORGANIZED HEALTH CARE EDUCATION/TRAINING PROGRAM

## 2024-02-22 ENCOUNTER — TREATMENT (OUTPATIENT)
Dept: OCCUPATIONAL THERAPY | Facility: CLINIC | Age: 79
End: 2024-02-22
Payer: MEDICARE

## 2024-02-22 DIAGNOSIS — S52.592D OTHER CLOSED FRACTURE OF DISTAL END OF LEFT RADIUS WITH ROUTINE HEALING, SUBSEQUENT ENCOUNTER: ICD-10-CM

## 2024-02-22 PROCEDURE — 97110 THERAPEUTIC EXERCISES: CPT | Mod: GO | Performed by: OCCUPATIONAL THERAPIST

## 2024-02-22 PROCEDURE — 97140 MANUAL THERAPY 1/> REGIONS: CPT | Mod: GO | Performed by: OCCUPATIONAL THERAPIST

## 2024-03-07 ENCOUNTER — TREATMENT (OUTPATIENT)
Dept: OCCUPATIONAL THERAPY | Facility: CLINIC | Age: 79
End: 2024-03-07
Payer: MEDICARE

## 2024-03-07 DIAGNOSIS — S52.592D OTHER CLOSED FRACTURE OF DISTAL END OF LEFT RADIUS WITH ROUTINE HEALING, SUBSEQUENT ENCOUNTER: ICD-10-CM

## 2024-03-07 PROCEDURE — 97110 THERAPEUTIC EXERCISES: CPT | Mod: GO | Performed by: OCCUPATIONAL THERAPIST

## 2024-03-07 NOTE — PROGRESS NOTES
"  Occupational Therapy  Occupational Therapy Orthopedic Evaluation    Patient Name: Yosvany Uriarte  MRN: [id number]  Today's Date: 3/7/2024  Time Calculation  Start Time: 1012  Stop Time: 1050  Time Calculation (min): 38 min    Insurance:  Visit number: 3 [facility] [place]  Authorization info: none required  Medicare certification  -  Start: 1/22/24   End: 3/22/24    Current Problem  1. Other closed fracture of distal end of left radius with routine healing, subsequent encounter  Follow Up In Occupational Therapy          Referred by: Mary Carmen Mendiola PA-C  Referred for:  L distal radius fx  Onset/DOI: 9/14/23    Fall risk: Low  Precautions: None     Medical History Form: Reviewed (scanned into chart)    Subjective   \"My wrist is better but I have been hesitant to use some machines at my gym\"    Hand Dominance: Right    Pain:   3-4  Location: L dorsal   Description: aching      Objective     WRIST AROM (Degrees)   R L   Extension WNL 55 (A)    70 (P)   Flexion WNL 50 (A)    65 (P)     HAND STRENGTH (Lbs)   R L   Dynamometer  55lb 44lb       Outcome Measures:  QUICK DASH: 20.45%    Treatments:  Exercise Sets/Reps Comments   Prayer stretch     Wrist flexion stretch     Finger ADD with red putty     Composite fist rest putty                            TREATMENT TODAY    Therapeutic ex  - green flexbar sup/pron to increase wrist strength 3x12  - 1lb hammer sup/pron to increase wrist stabilization strength 3x10  - 3lb wrist ABCs with intrinsic grasp to increase strength 3x  - green putty finger ADD to increase intrinsic  strength x 10 each web space  - green puttycize large knob to increase wrist and  strength x 8 min  - reviewed HEP to ensure porper carryover to home       EDUCATION: Home exercise program, plan of care, activity modifications, joint protection, pain management, and injury pathology       Assessment:   Excellent progress in strength since start of care.  Continues to tolerate upgraded resistive  and wrist " exercises without complaints of fatigue.  We discussed symptom mgt and best approach to her gym routine utilizing machine that involve pushing pulling to prevent and reduce increased wrist reactivity.  She understands to begin with pulling exercises with very little weight and to progress from that point and avoiding sharp pain.      Plan:     Planned Interventions include: therapeutic exercise, self-care home management, manual therapy, therapeutic activities, , neuromuscular coordination, vasopneumatic, dry needling, and orthosis fabrication.  Frequency: 1 x Week  Duration: 8 Weeks    Goals: Set and discussed today    1) Pt will demo age normative L  strength in order to open jars for meal prep without compensatory techniques, in 6 weeks.  2) Pt well demo at least 10 degrees improved L wrist extension for completion of work tasks, in 4 weeks.  3) Pt will correctly return demo of entire HEP to ensure proper carryover to home, in 2 weeks    Plan of care was developed with input and agreement by the patient      Harsha Alvarez OT

## 2024-03-12 DIAGNOSIS — G47.00 INSOMNIA, UNSPECIFIED TYPE: ICD-10-CM

## 2024-03-12 RX ORDER — ZOLPIDEM TARTRATE 10 MG/1
10 TABLET ORAL NIGHTLY PRN
Qty: 90 TABLET | Refills: 0 | Status: SHIPPED | OUTPATIENT
Start: 2024-03-12 | End: 2024-03-28 | Stop reason: SDUPTHER

## 2024-03-21 ENCOUNTER — TREATMENT (OUTPATIENT)
Dept: OCCUPATIONAL THERAPY | Facility: CLINIC | Age: 79
End: 2024-03-21
Payer: MEDICARE

## 2024-03-21 DIAGNOSIS — S52.592D OTHER CLOSED FRACTURE OF DISTAL END OF LEFT RADIUS WITH ROUTINE HEALING, SUBSEQUENT ENCOUNTER: Primary | ICD-10-CM

## 2024-03-21 PROCEDURE — 97110 THERAPEUTIC EXERCISES: CPT | Mod: GO | Performed by: OCCUPATIONAL THERAPIST

## 2024-03-21 PROCEDURE — 97112 NEUROMUSCULAR REEDUCATION: CPT | Mod: GO | Performed by: OCCUPATIONAL THERAPIST

## 2024-03-21 NOTE — PROGRESS NOTES
"    Occupational Therapy Orthopedic Treatment and Discharge    Patient Name: Yosvany Uriarte  MRN: [id number]  Today's Date: 3/21/2024  Time Calculation  Start Time: 0300  Stop Time: 0340  Time Calculation (min): 40 min    Insurance:  Visit number: 4 of [place]  Authorization info: none required  Medicare certification  -  Start: 1/22/24   End: 3/22/24    Current Problem  1. Other closed fracture of distal end of left radius with routine healing, subsequent encounter            Referred by: Mary Carmen Mendiola PA-C  Referred for:  L distal radius fx  Onset/DOI: 9/14/23    Fall risk: Low  Precautions: None     Medical History Form: Reviewed (scanned into chart)    Subjective   \"I started doing some of machines at the gym but lightly without an issue\".    Hand Dominance: Right    Pain:   2-3  Location: L dorsal   Description: aching      Objective     WRIST AROM (Degrees)   R L   Extension WNL 60 (A)    70 (P)   Flexion WNL 50 (A)    65 (P)     HAND STRENGTH (Lbs)   R L   Dynamometer  55lb 50lb       Outcome Measures:  QUICK DASH: 20.45%          TREATMENT TODAY    Neuro re-ed:  - finger and wrist AROM within fluidotherapy to promote proprioception re-ed and desensitization of wrist x 12min    Therapeutic ex  - green flexbar sup/pron to increase wrist strength 3x12  - 1lb hammer sup/pron to increase wrist stabilization strength 3x10  - 3lb wrist ABCs with intrinsic grasp to increase strength 3x  - green putty finger ADD to increase intrinsic  strength x 10 each web space  - reviewed HEP to ensure porper carryover to home       EDUCATION: Home exercise program, plan of care, activity modifications, joint protection, pain management, and injury pathology       Assessment:   No difficulty tolerating resistive ex today.  She has achieved all of her goals and has returned to PLOF.  She will continue progressing independently.      Plan:   discharge    Goals: Set and discussed today    1) Pt will demo age normative L  strength " in order to open jars for meal prep without compensatory techniques, in 6 weeks.  GOAL MET  2) Pt well demo at least 10 degrees improved L wrist extension for completion of work tasks, in 4 weeks.    GOAL MET  3) Pt will correctly return demo of entire HEP to ensure proper carryover to home, in 2 weeks    GOAL MET    Plan of care was developed with input and agreement by the patient      Harsha Alvarez, OT

## 2024-03-28 DIAGNOSIS — G47.00 INSOMNIA, UNSPECIFIED TYPE: ICD-10-CM

## 2024-03-28 DIAGNOSIS — I10 HYPERTENSION, UNSPECIFIED TYPE: ICD-10-CM

## 2024-03-28 RX ORDER — AMLODIPINE BESYLATE 10 MG/1
10 TABLET ORAL DAILY
Qty: 90 TABLET | Refills: 0 | Status: SHIPPED | OUTPATIENT
Start: 2024-03-28 | End: 2024-06-26

## 2024-03-28 RX ORDER — ZOLPIDEM TARTRATE 10 MG/1
10 TABLET ORAL NIGHTLY PRN
Qty: 90 TABLET | Refills: 0 | Status: SHIPPED | OUTPATIENT
Start: 2024-03-28 | End: 2024-06-26

## 2024-05-13 DIAGNOSIS — E78.5 HYPERLIPIDEMIA, UNSPECIFIED HYPERLIPIDEMIA TYPE: ICD-10-CM

## 2024-05-20 RX ORDER — ATORVASTATIN CALCIUM 20 MG/1
20 TABLET, FILM COATED ORAL DAILY
Qty: 90 TABLET | Refills: 3 | Status: SHIPPED | OUTPATIENT
Start: 2024-05-20

## 2024-05-30 ENCOUNTER — TELEPHONE (OUTPATIENT)
Dept: PRIMARY CARE | Facility: CLINIC | Age: 79
End: 2024-05-30
Payer: MEDICARE

## 2024-05-30 DIAGNOSIS — Z00.00 ROUTINE GENERAL MEDICAL EXAMINATION AT A HEALTH CARE FACILITY: ICD-10-CM

## 2024-05-30 DIAGNOSIS — Z92.89 HISTORY OF COMPLETE BLOOD COUNT: Primary | ICD-10-CM

## 2024-05-30 DIAGNOSIS — E78.5 HYPERLIPIDEMIA, UNSPECIFIED HYPERLIPIDEMIA TYPE: ICD-10-CM

## 2024-06-17 ENCOUNTER — APPOINTMENT (OUTPATIENT)
Dept: PRIMARY CARE | Facility: CLINIC | Age: 79
End: 2024-06-17
Payer: MEDICARE

## 2024-06-17 VITALS
RESPIRATION RATE: 16 BRPM | SYSTOLIC BLOOD PRESSURE: 134 MMHG | BODY MASS INDEX: 32.25 KG/M2 | WEIGHT: 182 LBS | HEART RATE: 61 BPM | DIASTOLIC BLOOD PRESSURE: 84 MMHG | OXYGEN SATURATION: 95 % | HEIGHT: 63 IN

## 2024-06-17 DIAGNOSIS — G47.00 INSOMNIA, UNSPECIFIED TYPE: ICD-10-CM

## 2024-06-17 DIAGNOSIS — Z79.899 CONTROLLED SUBSTANCE AGREEMENT SIGNED: ICD-10-CM

## 2024-06-17 DIAGNOSIS — E21.3 HYPERPARATHYROIDISM (MULTI): ICD-10-CM

## 2024-06-17 DIAGNOSIS — I10 HYPERTENSION, UNSPECIFIED TYPE: ICD-10-CM

## 2024-06-17 DIAGNOSIS — Z00.00 ROUTINE GENERAL MEDICAL EXAMINATION AT A HEALTH CARE FACILITY: Primary | ICD-10-CM

## 2024-06-17 PROBLEM — Z86.79 HISTORY OF HYPERTENSION: Status: ACTIVE | Noted: 2024-06-17

## 2024-06-17 PROBLEM — M25.539 ARTHRALGIA OF WRIST: Status: ACTIVE | Noted: 2024-06-17

## 2024-06-17 PROBLEM — U07.1 DISEASE DUE TO SEVERE ACUTE RESPIRATORY SYNDROME CORONAVIRUS 2 (SARS-COV-2): Status: ACTIVE | Noted: 2024-06-17

## 2024-06-17 LAB
AMPHETAMINES UR QL SCN: NORMAL
BARBITURATES UR QL SCN: NORMAL
BENZODIAZ UR QL SCN: NORMAL
BZE UR QL SCN: NORMAL
CANNABINOIDS UR QL SCN: NORMAL
FENTANYL+NORFENTANYL UR QL SCN: NORMAL
METHADONE UR QL SCN: NORMAL
OPIATES UR QL SCN: NORMAL
OXYCODONE+OXYMORPHONE UR QL SCN: NORMAL
PCP UR QL SCN: NORMAL

## 2024-06-17 PROCEDURE — 80307 DRUG TEST PRSMV CHEM ANLYZR: CPT

## 2024-06-17 PROCEDURE — 1159F MED LIST DOCD IN RCRD: CPT | Performed by: INTERNAL MEDICINE

## 2024-06-17 PROCEDURE — 3079F DIAST BP 80-89 MM HG: CPT | Performed by: INTERNAL MEDICINE

## 2024-06-17 PROCEDURE — G0439 PPPS, SUBSEQ VISIT: HCPCS | Performed by: INTERNAL MEDICINE

## 2024-06-17 PROCEDURE — 3075F SYST BP GE 130 - 139MM HG: CPT | Performed by: INTERNAL MEDICINE

## 2024-06-17 RX ORDER — AMLODIPINE BESYLATE 10 MG/1
10 TABLET ORAL DAILY
Qty: 90 TABLET | Refills: 3 | Status: SHIPPED | OUTPATIENT
Start: 2024-06-17 | End: 2025-06-17

## 2024-06-17 RX ORDER — METOPROLOL SUCCINATE 50 MG/1
50 TABLET, EXTENDED RELEASE ORAL DAILY
Qty: 90 TABLET | Refills: 3 | Status: SHIPPED | OUTPATIENT
Start: 2024-07-22 | End: 2025-07-22

## 2024-06-17 ASSESSMENT — ENCOUNTER SYMPTOMS
OCCASIONAL FEELINGS OF UNSTEADINESS: 0
DEPRESSION: 0
LOSS OF SENSATION IN FEET: 0

## 2024-06-17 NOTE — PROGRESS NOTES
"Subjective   Patient ID: Yosvany Uriarte is a 78 y.o. female who presents for Medicare Annual Wellness Visit Subsequent.    78 F    AWV/CPE    Doing Well-    HTN    HLD    HPI     Review of Systems      No Fever/chills/headaches/dizziness/chest pains/ cough/ shortness of breath/palpitations/ abdominal pain /Nausea/vomiting/diarrhea/ constipation/urine frequency/blood in urine.      Objective   Pulse 61   Resp 16   Ht 1.595 m (5' 2.8\")   Wt 82.6 kg (182 lb)   SpO2 95%   BMI 32.45 kg/m²     Physical Exam    No JVP elevation. No palpable Lymph Nodes. No Thyromegaly    HEENT- Negative    [facility]-NL S1/S2 . No MRG    Lungs-CTA. B/S= B/L    Abdomen-Soft, Non-tender. No masses or HSM    Extremities: No C/C/E    Skin-No abnormal moles/rash      Assessment/Plan   Problem List Items Addressed This Visit             ICD-10-CM    Hypertension I10    Relevant Medications    metoprolol succinate XL (Toprol-XL) 50 mg 24 hr tablet (Start on 7/22/2024)    amLODIPine (Norvasc) 10 mg tablet    Insomnia G47.00    Hyperparathyroidism (Multi) E21.3     H/o parathyroidectomy- Diagnosis  reviewed. well controlled. Continue with current Rx.           Other Visit Diagnoses         Codes    Routine general medical examination at a health care facility    -  Primary Z00.00    Controlled substance agreement signed     Z79.899    Relevant Orders    Drug Screen, Urine With Reflex to Confirmation          Doing Well-    HTN-Goal < 130/80- - Encouraged sodium restriction, DASH or Mediterranean diet  -Continue with current Rx    HLD     Insomnia- Continue with Zolpidem 10 mg at bedtime    OARRS report  reviewed     Urine drug screen/ Annual Contract      Continue with current Rx    Follow up 3 months/PRN      "

## 2024-07-03 DIAGNOSIS — G47.00 INSOMNIA, UNSPECIFIED TYPE: ICD-10-CM

## 2024-07-03 RX ORDER — ZOLPIDEM TARTRATE 10 MG/1
10 TABLET ORAL NIGHTLY PRN
Qty: 90 TABLET | Refills: 0 | Status: SHIPPED | OUTPATIENT
Start: 2024-07-03 | End: 2024-10-01

## 2024-07-11 ENCOUNTER — OFFICE VISIT (OUTPATIENT)
Dept: OBSTETRICS AND GYNECOLOGY | Facility: CLINIC | Age: 79
End: 2024-07-11
Payer: MEDICARE

## 2024-07-11 VITALS
SYSTOLIC BLOOD PRESSURE: 160 MMHG | HEIGHT: 64 IN | HEART RATE: 73 BPM | WEIGHT: 183.4 LBS | BODY MASS INDEX: 31.31 KG/M2 | DIASTOLIC BLOOD PRESSURE: 77 MMHG

## 2024-07-11 DIAGNOSIS — N90.89 LABIAL IRRITATION: Primary | ICD-10-CM

## 2024-07-11 DIAGNOSIS — N39.0 URINARY TRACT INFECTION WITHOUT HEMATURIA, SITE UNSPECIFIED: ICD-10-CM

## 2024-07-11 DIAGNOSIS — N39.9 URINARY DISORDER: ICD-10-CM

## 2024-07-11 LAB
POC APPEARANCE, URINE: CLEAR
POC BILIRUBIN, URINE: NEGATIVE
POC BLOOD, URINE: NEGATIVE
POC COLOR, URINE: YELLOW
POC GLUCOSE, URINE: NEGATIVE MG/DL
POC KETONES, URINE: NEGATIVE MG/DL
POC LEUKOCYTES, URINE: NEGATIVE
POC NITRITE,URINE: NEGATIVE
POC PH, URINE: 6 PH
POC PROTEIN, URINE: NEGATIVE MG/DL
POC SPECIFIC GRAVITY, URINE: >=1.03
POC UROBILINOGEN, URINE: 0.2 EU/DL

## 2024-07-11 PROCEDURE — 3078F DIAST BP <80 MM HG: CPT | Performed by: NURSE PRACTITIONER

## 2024-07-11 PROCEDURE — 1159F MED LIST DOCD IN RCRD: CPT | Performed by: NURSE PRACTITIONER

## 2024-07-11 PROCEDURE — 81003 URINALYSIS AUTO W/O SCOPE: CPT | Performed by: NURSE PRACTITIONER

## 2024-07-11 PROCEDURE — 81003 URINALYSIS AUTO W/O SCOPE: CPT | Mod: QW | Performed by: NURSE PRACTITIONER

## 2024-07-11 PROCEDURE — 99213 OFFICE O/P EST LOW 20 MIN: CPT | Performed by: NURSE PRACTITIONER

## 2024-07-11 PROCEDURE — 1126F AMNT PAIN NOTED NONE PRSNT: CPT | Performed by: NURSE PRACTITIONER

## 2024-07-11 PROCEDURE — 3077F SYST BP >= 140 MM HG: CPT | Performed by: NURSE PRACTITIONER

## 2024-07-11 RX ORDER — CLOTRIMAZOLE AND BETAMETHASONE DIPROPIONATE 10; .64 MG/G; MG/G
1 CREAM TOPICAL 2 TIMES DAILY PRN
Qty: 30 G | Refills: 2 | Status: SHIPPED | OUTPATIENT
Start: 2024-07-11 | End: 2025-07-11

## 2024-07-11 ASSESSMENT — ENCOUNTER SYMPTOMS
ENDOCRINE NEGATIVE: 1
RESPIRATORY NEGATIVE: 1
NEUROLOGICAL NEGATIVE: 1
EYES NEGATIVE: 1
PSYCHIATRIC NEGATIVE: 1
MUSCULOSKELETAL NEGATIVE: 1
GASTROINTESTINAL NEGATIVE: 1
CARDIOVASCULAR NEGATIVE: 1
CONSTITUTIONAL NEGATIVE: 1

## 2024-07-11 ASSESSMENT — PAIN SCALES - GENERAL: PAINLEVEL: 0-NO PAIN

## 2024-07-11 NOTE — PROGRESS NOTES
Subjective   Patient ID: Yosvany Uriarte is a 79 y.o. female who presents for follow up.   HPI  79-year-old female being assessed for urethral caruncle along with vaginal itching and irritation.    Record Review:  - 1/8/2024 Dr. Burnett note RE: Breast cancer screening, well woman visit - Normal breast and pelvic exam. Mammogram requisition sent. Elevated BP, HTN - BP was 199/73 and repeat BP in supine position was 162/74. She was reassured about this. She may consider follow-up with her PCP to discuss antihypertensive medication management with medication and dietary changes/less sodium intake. Urethral caruncle - Minimal in size (2mm) and not bothersome.    Vaginal Symptoms:   - She has complaints of vaginal irritation and itching.  - She has been using an OTC hydrocortisone cream with aloe and vitamins from [facility], but she denies relief.  - The itching is described as annoying but not painful.  - She has a hx of inclusion cysts in the vaginal area, which she has managed with Vaseline in the past.  - The current itching is localized inside the labia, with no associated discharge or bleeding.    Review of Systems   Constitutional: Negative.    HENT: Negative.     Eyes: Negative.    Respiratory: Negative.     Cardiovascular: Negative.    Gastrointestinal: Negative.    Endocrine: Negative.    Genitourinary:         Vaginal irritation and itching, no discharge, no bleeding.   Musculoskeletal: Negative.    Neurological: Negative.    Psychiatric/Behavioral: Negative.         Objective   Physical Exam  Constitutional:       Appearance: Normal appearance.   HENT:      Head: Normocephalic and atraumatic.   Eyes:      Extraocular Movements: Extraocular movements intact.      Pupils: Pupils are equal, round, and reactive to light.   Genitourinary:     Comments: Bilateral labia majora with inclusion cysts, all appearing normal. Right labia majora is itchy and uncomfortable, but skin is intact with no redness or  inflammation.  Neurological:      General: No focal deficit present.      Mental Status: She is alert and oriented to person, place, and time. Mental status is at baseline.   Psychiatric:         Mood and Affect: Mood normal.         Behavior: Behavior normal.         Thought Content: Thought content normal.         Judgment: Judgment normal.         Assessment/Plan   79-year-old female being assessed for urethral caruncle along with vaginal itching and irritation.    Diagnoses:  #1 Vaginal itching and irritation  #2 Inclusion cysts  #3 Urethral caruncle    Plan:  Vaginal itching and irritation, inclusion cysts  - Rx for combination steroid and antifungal cream (Lotrisone) to be applied to the labia twice a day as needed for itching sent to the patient's preferred pharmacy.  - Educated her on the proper application of the cream.  - Discussed the use of the cream and its potential benefits.  - Reassured her that there are no concerning findings on the physical exam.    2. Urethral caruncle  - Continue to monitor, no current signs of bleeding or inflammation.    Follow-up with Dr. Burnett as scheduled in January. She was advised to return if sx persist or worsen.    Scribe Attestation  By signing my name below, Eladio PAT Scribe, attest that this documentation has been prepared under the direction and in the presence of KUNAL Bañuelos on 07/11/2024 at 1:09 PM.  I, KUNAL Bañuelos, personally performed the services described in this documentation which was scribed virtually and I confirm that it is both accurate and complete.       KUNAL Bañuelos 07/11/24 10:06 AM

## 2024-07-24 DIAGNOSIS — G47.00 INSOMNIA, UNSPECIFIED TYPE: ICD-10-CM

## 2024-07-24 RX ORDER — ZOLPIDEM TARTRATE 10 MG/1
10 TABLET ORAL NIGHTLY PRN
Qty: 90 TABLET | Refills: 0 | Status: SHIPPED | OUTPATIENT
Start: 2024-07-24 | End: 2024-10-22

## 2024-07-24 NOTE — TELEPHONE ENCOUNTER
Patient stated she got a letter from [employer] stating that zolipidem is on back order she want to know if you can call it in to her local pharmacy to [facility] I pend and sent it

## 2024-08-14 ENCOUNTER — TELEPHONE (OUTPATIENT)
Dept: PRIMARY CARE | Facility: CLINIC | Age: 79
End: 2024-08-14
Payer: MEDICARE

## 2024-08-14 DIAGNOSIS — U07.1 COVID: Primary | ICD-10-CM

## 2024-08-14 RX ORDER — NIRMATRELVIR AND RITONAVIR 300-100 MG
3 KIT ORAL 2 TIMES DAILY
Qty: 5 DOSE PACK | Refills: 0 | Status: SHIPPED | OUTPATIENT
Start: 2024-08-14 | End: 2024-08-19

## 2024-08-14 NOTE — TELEPHONE ENCOUNTER
Tested positive for covid today want to know if she can get paxovid runny nose just a little lighthead no fever

## 2024-09-11 ENCOUNTER — LAB (OUTPATIENT)
Dept: LAB | Facility: LAB | Age: 79
End: 2024-09-11
Payer: MEDICARE

## 2024-09-11 DIAGNOSIS — Z92.89 HISTORY OF COMPLETE BLOOD COUNT: ICD-10-CM

## 2024-09-11 DIAGNOSIS — Z00.00 ROUTINE GENERAL MEDICAL EXAMINATION AT A HEALTH CARE FACILITY: ICD-10-CM

## 2024-09-11 LAB
ALBUMIN SERPL BCP-MCNC: 4 G/DL (ref 3.4–5)
ALP SERPL-CCNC: 47 U/L (ref 33–136)
ALT SERPL W P-5'-P-CCNC: 20 U/L (ref 7–45)
ANION GAP SERPL CALC-SCNC: 10 MMOL/L (ref 10–20)
AST SERPL W P-5'-P-CCNC: 20 U/L (ref 9–39)
BASOPHILS # BLD AUTO: 0.04 X10*3/UL (ref 0–0.1)
BASOPHILS NFR BLD AUTO: 0.7 %
BILIRUB SERPL-MCNC: 0.5 MG/DL (ref 0–1.2)
BUN SERPL-MCNC: 30 MG/DL (ref 6–23)
CALCIUM SERPL-MCNC: 9.9 MG/DL (ref 8.6–10.3)
CHLORIDE SERPL-SCNC: 104 MMOL/L (ref 98–107)
CO2 SERPL-SCNC: 30 MMOL/L (ref 21–32)
CREAT SERPL-MCNC: 0.9 MG/DL (ref 0.5–1.05)
EGFRCR SERPLBLD CKD-EPI 2021: 65 ML/MIN/1.73M*2
EOSINOPHIL # BLD AUTO: 0.14 X10*3/UL (ref 0–0.4)
EOSINOPHIL NFR BLD AUTO: 2.4 %
ERYTHROCYTE [DISTWIDTH] IN BLOOD BY AUTOMATED COUNT: 12 % (ref 11.5–14.5)
GLUCOSE SERPL-MCNC: 85 MG/DL (ref 74–99)
HCT VFR BLD AUTO: 45 % (ref 36–46)
HGB BLD-MCNC: 14.4 G/DL (ref 12–16)
IMM GRANULOCYTES # BLD AUTO: 0.03 X10*3/UL (ref 0–0.5)
IMM GRANULOCYTES NFR BLD AUTO: 0.5 % (ref 0–0.9)
LYMPHOCYTES # BLD AUTO: 1.52 X10*3/UL (ref 0.8–3)
LYMPHOCYTES NFR BLD AUTO: 26.4 %
MCH RBC QN AUTO: 28.3 PG (ref 26–34)
MCHC RBC AUTO-ENTMCNC: 32 G/DL (ref 32–36)
MCV RBC AUTO: 89 FL (ref 80–100)
MONOCYTES # BLD AUTO: 0.62 X10*3/UL (ref 0.05–0.8)
MONOCYTES NFR BLD AUTO: 10.8 %
NEUTROPHILS # BLD AUTO: 3.41 X10*3/UL (ref 1.6–5.5)
NEUTROPHILS NFR BLD AUTO: 59.2 %
NRBC BLD-RTO: 0 /100 WBCS (ref 0–0)
PLATELET # BLD AUTO: 353 X10*3/UL (ref 150–450)
POTASSIUM SERPL-SCNC: 4.7 MMOL/L (ref 3.5–5.3)
PROT SERPL-MCNC: 7.2 G/DL (ref 6.4–8.2)
RBC # BLD AUTO: 5.08 X10*6/UL (ref 4–5.2)
SODIUM SERPL-SCNC: 139 MMOL/L (ref 136–145)
WBC # BLD AUTO: 5.8 X10*3/UL (ref 4.4–11.3)

## 2024-09-11 PROCEDURE — 36415 COLL VENOUS BLD VENIPUNCTURE: CPT

## 2024-09-11 PROCEDURE — 85025 COMPLETE CBC W/AUTO DIFF WBC: CPT

## 2024-09-11 PROCEDURE — 80053 COMPREHEN METABOLIC PANEL: CPT

## 2024-09-29 DIAGNOSIS — M54.16 LUMBAR NEURITIS: Primary | ICD-10-CM

## 2024-09-29 RX ORDER — MELOXICAM 7.5 MG/1
7.5 TABLET ORAL DAILY
Qty: 90 TABLET | Refills: 0 | Status: SHIPPED | OUTPATIENT
Start: 2024-09-29

## 2024-10-07 DIAGNOSIS — M81.0 AGE RELATED OSTEOPOROSIS, UNSPECIFIED PATHOLOGICAL FRACTURE PRESENCE: ICD-10-CM

## 2024-10-15 RX ORDER — ALENDRONATE SODIUM 70 MG/1
TABLET ORAL
Qty: 12 TABLET | Refills: 3 | Status: SHIPPED | OUTPATIENT
Start: 2024-10-15

## 2024-10-15 NOTE — TELEPHONE ENCOUNTER
Request received for   Requested Prescriptions     Pending Prescriptions Disp Refills    alendronate (Fosamax) 70 mg tablet [Pharmacy Med Name: Alendronate Sodium 70 MG Oral Tablet] 12 tablet 3     Sig: TAKE 1 TABLET BY MOUTH WEEKLY  WITH 8 OZ OF PLAIN WATER 30  MINUTES BEFORE FIRST FOOD, DRINK OR MEDS. STAY UPRIGHT FOR 30  MINS       Yosvany Uriarte was last seen 7/11/2024 and has an appt for 1/13/2025.

## 2024-10-17 DIAGNOSIS — G47.00 INSOMNIA, UNSPECIFIED TYPE: ICD-10-CM

## 2024-10-17 RX ORDER — ZOLPIDEM TARTRATE 10 MG/1
10 TABLET ORAL NIGHTLY PRN
Qty: 90 TABLET | Refills: 0 | Status: SHIPPED | OUTPATIENT
Start: 2024-10-17 | End: 2025-01-15

## 2024-10-21 PROBLEM — L91.8 SKIN TAG: Status: RESOLVED | Noted: 2023-05-19 | Resolved: 2024-10-21

## 2024-10-21 PROBLEM — S63.509A SPRAIN OF WRIST: Status: ACTIVE | Noted: 2024-10-21

## 2024-10-21 PROBLEM — E83.52 HYPERCALCEMIA: Status: RESOLVED | Noted: 2023-05-19 | Resolved: 2024-10-21

## 2024-10-21 PROBLEM — J01.90 ACUTE SINUSITIS: Status: RESOLVED | Noted: 2023-05-19 | Resolved: 2024-10-21

## 2024-10-21 PROBLEM — M54.12 CERVICAL NEURITIS: Status: RESOLVED | Noted: 2023-05-19 | Resolved: 2024-10-21

## 2024-10-21 PROBLEM — N20.1 URETERAL STONE: Status: RESOLVED | Noted: 2023-05-19 | Resolved: 2024-10-21

## 2024-10-21 PROBLEM — U07.1 DISEASE DUE TO SEVERE ACUTE RESPIRATORY SYNDROME CORONAVIRUS 2 (SARS-COV-2): Status: RESOLVED | Noted: 2024-06-17 | Resolved: 2024-10-21

## 2024-10-21 PROBLEM — N36.2 URETHRAL CARUNCLE: Status: RESOLVED | Noted: 2023-05-19 | Resolved: 2024-10-21

## 2024-10-21 PROBLEM — G62.9 INFLAMMATION OF PERIPHERAL NERVE: Status: ACTIVE | Noted: 2023-05-19

## 2024-10-21 PROBLEM — N95.2 VAGINAL ATROPHY: Status: RESOLVED | Noted: 2023-05-19 | Resolved: 2024-10-21

## 2024-10-21 PROBLEM — E89.2 POSTPROCEDURAL HYPOPARATHYROIDISM (MULTI): Status: ACTIVE | Noted: 2024-10-21

## 2024-10-21 PROBLEM — R11.0 NAUSEA: Status: RESOLVED | Noted: 2023-05-19 | Resolved: 2024-10-21

## 2024-10-21 PROBLEM — R10.9 ABDOMINAL PAIN: Status: RESOLVED | Noted: 2023-05-19 | Resolved: 2024-10-21

## 2024-10-21 PROBLEM — K21.9 GERD (GASTROESOPHAGEAL REFLUX DISEASE): Status: RESOLVED | Noted: 2023-05-19 | Resolved: 2024-10-21

## 2024-10-21 PROBLEM — N20.0 CALCULUS OF KIDNEY: Status: RESOLVED | Noted: 2023-05-19 | Resolved: 2024-10-21

## 2024-10-21 PROBLEM — N39.0 UTI (URINARY TRACT INFECTION): Status: RESOLVED | Noted: 2023-05-19 | Resolved: 2024-10-21

## 2024-10-21 PROBLEM — J34.89 NASAL DRAINAGE: Status: RESOLVED | Noted: 2023-05-19 | Resolved: 2024-10-21

## 2024-10-21 PROBLEM — G47.00 INSOMNIA: Status: RESOLVED | Noted: 2023-05-19 | Resolved: 2024-10-21

## 2024-10-21 PROBLEM — R22.1 NECK SWELLING: Status: RESOLVED | Noted: 2023-05-19 | Resolved: 2024-10-21

## 2024-10-21 PROBLEM — R05.9 COUGH: Status: RESOLVED | Noted: 2023-05-19 | Resolved: 2024-10-21

## 2024-10-21 PROBLEM — R04.0 EPISTAXIS: Status: RESOLVED | Noted: 2023-05-19 | Resolved: 2024-10-21

## 2024-10-21 PROBLEM — J34.89 NOSE DRYNESS: Status: RESOLVED | Noted: 2023-05-19 | Resolved: 2024-10-21

## 2024-10-21 PROBLEM — R07.81 RIB PAIN ON LEFT SIDE: Status: RESOLVED | Noted: 2023-05-19 | Resolved: 2024-10-21

## 2024-10-21 RX ORDER — PREDNISOLONE ACETATE 10 MG/ML
SUSPENSION/ DROPS OPHTHALMIC
COMMUNITY
Start: 2024-07-15

## 2024-10-21 RX ORDER — SODIUM FLUORIDE 1.1 G/100G
CREAM ORAL
COMMUNITY
Start: 2024-06-28

## 2024-10-23 ENCOUNTER — APPOINTMENT (OUTPATIENT)
Dept: PRIMARY CARE | Facility: CLINIC | Age: 79
End: 2024-10-23
Payer: MEDICARE

## 2024-10-23 VITALS
HEIGHT: 64 IN | BODY MASS INDEX: 32.71 KG/M2 | HEART RATE: 62 BPM | DIASTOLIC BLOOD PRESSURE: 74 MMHG | TEMPERATURE: 97.7 F | WEIGHT: 191.6 LBS | SYSTOLIC BLOOD PRESSURE: 142 MMHG | OXYGEN SATURATION: 95 %

## 2024-10-23 DIAGNOSIS — M81.0 AGE-RELATED OSTEOPOROSIS WITHOUT CURRENT PATHOLOGICAL FRACTURE: ICD-10-CM

## 2024-10-23 DIAGNOSIS — I10 PRIMARY HYPERTENSION: Primary | ICD-10-CM

## 2024-10-23 PROCEDURE — 1036F TOBACCO NON-USER: CPT | Performed by: FAMILY MEDICINE

## 2024-10-23 PROCEDURE — 99214 OFFICE O/P EST MOD 30 MIN: CPT | Performed by: FAMILY MEDICINE

## 2024-10-23 PROCEDURE — 1159F MED LIST DOCD IN RCRD: CPT | Performed by: FAMILY MEDICINE

## 2024-10-23 PROCEDURE — 3077F SYST BP >= 140 MM HG: CPT | Performed by: FAMILY MEDICINE

## 2024-10-23 PROCEDURE — 1123F ACP DISCUSS/DSCN MKR DOCD: CPT | Performed by: FAMILY MEDICINE

## 2024-10-23 PROCEDURE — 3078F DIAST BP <80 MM HG: CPT | Performed by: FAMILY MEDICINE

## 2024-10-23 RX ORDER — AA/PROT/LYSINE/METHIO/VIT C/B6 50-12.5 MG
10 TABLET ORAL DAILY
COMMUNITY

## 2024-10-23 ASSESSMENT — ENCOUNTER SYMPTOMS
OCCASIONAL FEELINGS OF UNSTEADINESS: 0
DEPRESSION: 0
LOSS OF SENSATION IN FEET: 0

## 2024-10-23 ASSESSMENT — PATIENT HEALTH QUESTIONNAIRE - PHQ9
1. LITTLE INTEREST OR PLEASURE IN DOING THINGS: NOT AT ALL
2. FEELING DOWN, DEPRESSED OR HOPELESS: NOT AT ALL
SUM OF ALL RESPONSES TO PHQ9 QUESTIONS 1 AND 2: 0

## 2024-10-23 NOTE — PROGRESS NOTES
"Subjective   Patient ID: Yosvany Uriarte is a 79 y.o. female who presents for [facility].    HPI   Presents to [facility] care  Patient is a former patient of Dr. Paul and overall in good health  Main issues have been HTN which is well controlled with her current regimen.  Deals with insomnia and takes Zolpidem routinely for this issue, no issues or problems.  Able to get sleep with medication.  Does see Dr. Burnett for GYN, up to date with screening.    No acute concerns today.  Review of Systems  Negative unless noted in HPI    Objective   /74 (BP Location: Right arm, Patient Position: Sitting, BP Cuff Size: Adult)   Pulse 62   Temp 36.5 °C (97.7 °F) (Temporal)   Ht 1.626 m (5' 4\")   Wt 86.9 kg (191 lb 9.6 oz)   SpO2 95%   BMI 32.89 kg/m²     Physical Exam  Constitutional:       Appearance: Normal appearance.   Cardiovascular:      Rate and Rhythm: Normal rate and regular rhythm.   Pulmonary:      Effort: Pulmonary effort is normal.      Breath sounds: Normal breath sounds.   Neurological:      General: No focal deficit present.      Mental Status: She is alert and oriented to person, place, and time.   Psychiatric:         Mood and Affect: Mood normal.         Assessment/Plan   Problem List Items Addressed This Visit             ICD-10-CM    Hypertension - Primary I10     Well controlled  Continue on current regimen  Labs up to date         Osteoporosis M81.0     On Fosamax, no issues               "

## 2024-11-15 ENCOUNTER — PATIENT MESSAGE (OUTPATIENT)
Dept: PRIMARY CARE | Facility: CLINIC | Age: 79
End: 2024-11-15
Payer: MEDICARE

## 2024-11-26 ENCOUNTER — OFFICE VISIT (OUTPATIENT)
Dept: CARDIOLOGY | Facility: CLINIC | Age: 79
End: 2024-11-26
Payer: MEDICARE

## 2024-11-26 VITALS
OXYGEN SATURATION: 97 % | BODY MASS INDEX: 31.93 KG/M2 | SYSTOLIC BLOOD PRESSURE: 163 MMHG | WEIGHT: 186 LBS | DIASTOLIC BLOOD PRESSURE: 86 MMHG | HEART RATE: 64 BPM

## 2024-11-26 DIAGNOSIS — E78.5 HYPERLIPIDEMIA, UNSPECIFIED HYPERLIPIDEMIA TYPE: ICD-10-CM

## 2024-11-26 DIAGNOSIS — I10 HYPERTENSION, UNSPECIFIED TYPE: ICD-10-CM

## 2024-11-26 DIAGNOSIS — R00.2 PALPITATIONS: Primary | ICD-10-CM

## 2024-11-26 PROCEDURE — 93005 ELECTROCARDIOGRAM TRACING: CPT | Performed by: NURSE PRACTITIONER

## 2024-11-26 PROCEDURE — 1036F TOBACCO NON-USER: CPT | Performed by: NURSE PRACTITIONER

## 2024-11-26 PROCEDURE — 3079F DIAST BP 80-89 MM HG: CPT | Performed by: NURSE PRACTITIONER

## 2024-11-26 PROCEDURE — 99214 OFFICE O/P EST MOD 30 MIN: CPT | Performed by: NURSE PRACTITIONER

## 2024-11-26 PROCEDURE — 1159F MED LIST DOCD IN RCRD: CPT | Performed by: NURSE PRACTITIONER

## 2024-11-26 PROCEDURE — G2211 COMPLEX E/M VISIT ADD ON: HCPCS | Performed by: NURSE PRACTITIONER

## 2024-11-26 PROCEDURE — 1123F ACP DISCUSS/DSCN MKR DOCD: CPT | Performed by: NURSE PRACTITIONER

## 2024-11-26 PROCEDURE — 93010 ELECTROCARDIOGRAM REPORT: CPT | Performed by: INTERNAL MEDICINE

## 2024-11-26 PROCEDURE — 3077F SYST BP >= 140 MM HG: CPT | Performed by: NURSE PRACTITIONER

## 2024-11-26 ASSESSMENT — ENCOUNTER SYMPTOMS
RESPIRATORY NEGATIVE: 1
ENDOCRINE NEGATIVE: 1
HEMATOLOGIC/LYMPHATIC NEGATIVE: 1
CONSTITUTIONAL NEGATIVE: 1
PALPITATIONS: 1
MYALGIAS: 1
EYES NEGATIVE: 1
NEUROLOGICAL NEGATIVE: 1
GASTROINTESTINAL NEGATIVE: 1
PSYCHIATRIC NEGATIVE: 1

## 2024-11-26 NOTE — PROGRESS NOTES
"Referred by Dr. Garzon ref. provider found for Palpitations (With L upper arm discomfort, \"burning sensation\", per patient.  Intermittently.)     History Of Present Illness:    Ysovany Uriarte is a very pleasant 79 year old female with a history of PVCs, HTN and HLD, she is here for a follow up visit. The patient is seen in collaboration with Dr. Ross. Mrs. Uriarte complains of heart palpitations, held off on the meloxicam to help with heart palpitations. Has had burning sensation in her arm occurs at any time. Exercises on a regular basis without limitations. Denies shortness of breath or chest pain. Feels like her heart is skipping a beat lasts  a few seconds at a time. Has started taking Motrin, palpitations have improved.     Review of Systems   Constitutional: Negative.   HENT: Negative.     Eyes: Negative.    Cardiovascular:  Positive for palpitations.   Respiratory: Negative.     Endocrine: Negative.    Hematologic/Lymphatic: Negative.    Skin: Negative.    Musculoskeletal:  Positive for myalgias.   Gastrointestinal: Negative.    Neurological: Negative.    Psychiatric/Behavioral: Negative.        Past Medical History:  She has a past medical history of Arthritis, Hypertension (Don’t know), Personal history of other diseases of the circulatory system, and Personal history of other diseases of the musculoskeletal system and connective tissue.    Past Surgical History:  She has a past surgical history that includes Tubal ligation (08/13/2013); Other surgical history (10/12/2016); Other surgical history (11/05/2020); Other surgical history (06/26/2019); and Tonsillectomy (Child).      Social History:  She reports that she quit smoking about 60 years ago. Her smoking use included cigarettes. She has been exposed to tobacco smoke. She has never used smokeless tobacco. She reports current alcohol use of about 3.0 standard drinks of alcohol per week. She reports that she does not use drugs.    Family History:  Family " History   Problem Relation Name Age of Onset    Cancer Sister Cherry Hummel     Breast cancer Sister Cherry Hummel     Leukemia Other      Coronary artery disease Other      Heart disease Father Chun Polanco         Allergies:  Amoxicillin    Outpatient Medications:  Current Outpatient Medications   Medication Instructions    alendronate (Fosamax) 70 mg tablet TAKE 1 TABLET BY MOUTH WEEKLY  WITH 8 OZ OF PLAIN WATER 30  MINUTES BEFORE FIRST FOOD, DRINK OR MEDS. STAY UPRIGHT FOR 30  MINS    amLODIPine (NORVASC) 10 mg, oral, Daily    ascorbic acid (VITAMIN C ORAL) Daily    atorvastatin (LIPITOR) 20 mg, oral, Daily    clotrimazole-betamethasone (Lotrisone) cream 1 Application, Topical, 2 times daily PRN    coenzyme Q-10 (CO Q-10) 10 mg, Daily    Denta 5000 Plus 1.1 % dental cream BRUSH TEETH NORMALLY AND BRUSH WITH TOPICAL FLOURIDE AND DO NOT RINSE OFF 2X A DAY    meloxicam (MOBIC) 7.5 mg, oral, Daily    metoprolol succinate XL (TOPROL-XL) 50 mg, oral, Daily    multivitamin tablet qd    TURMERIC ORAL Take by mouth.    zolpidem (AMBIEN) 10 mg, oral, Nightly PRN        Last Recorded Vitals:  Vitals:    11/26/24 1337   BP: 163/86   Pulse: 64   SpO2: 97%   Weight: 84.4 kg (186 lb)       Physical Exam:  Physical Exam  Vitals reviewed.   HENT:      Head: Normocephalic.      Nose: Nose normal.   Eyes:      Pupils: Pupils are equal, round, and reactive to light.   Cardiovascular:      Rate and Rhythm: Normal rate and regular rhythm. Occasional ectopic heart beat   Pulmonary:      Effort: Pulmonary effort is normal.      Breath sounds: Normal breath sounds.   Abdominal:      General: Abdomen is flat.      Palpations: Abdomen is soft.   Musculoskeletal:         General: Normal range of motion.      Cervical back: Normal range of motion.   Skin:     General: Skin is warm and dry.   Neurological:      General: No focal deficit present.      Mental Status: He is alert and oriented to person, place, and time.   Psychiatric:         Mood  and Affect: Mood normal.     /     Last Labs:  CBC -  Lab Results   Component Value Date    WBC 5.8 09/11/2024    HGB 14.4 09/11/2024    HCT 45.0 09/11/2024    MCV 89 09/11/2024     09/11/2024       CMP -  Lab Results   Component Value Date    CALCIUM 9.9 09/11/2024    PROT 7.2 09/11/2024    ALBUMIN 4.0 09/11/2024    AST 20 09/11/2024    ALT 20 09/11/2024    ALKPHOS 47 09/11/2024    BILITOT 0.5 09/11/2024       LIPID PANEL -   Lab Results   Component Value Date    CHOL 185 06/01/2023    TRIG 182 (H) 06/01/2023    HDL 53.5 06/01/2023    CHHDL 3.5 06/01/2023    LDLF 95 06/01/2023    VLDL 36 06/01/2023       RENAL FUNCTION PANEL -   Lab Results   Component Value Date    GLUCOSE 85 09/11/2024     09/11/2024    K 4.7 09/11/2024     09/11/2024    CO2 30 09/11/2024    ANIONGAP 10 09/11/2024    BUN 30 (H) 09/11/2024    CREATININE 0.90 09/11/2024    CALCIUM 9.9 09/11/2024    ALBUMIN 4.0 09/11/2024        Lab Results   Component Value Date     (H) 10/10/2017       Last Cardiology Tests:  ECG:  EKG independently reviewed from today showed sinus rhythm heart rate first degree AV block heart rate 61 bpm   Echo:  Echocardiogram 6/6/2023  1. Left ventricular systolic function is normal.  2. Spectral Doppler shows an impaired relaxation pattern of left ventricular diastolic filling.  3. Mild aortic valve regurgitation.  Ejection Fractions:  LVEF 60%  Cath:    Stress Test:  No results found for this or any previous visit from the past 1095 days.    Cardiac Imaging:  CT cardiac scoring wo IV contrast 10/26/2023  LM 0,  .48,  LCx 0,  RCA 20.22,      Total 151.7      Heart monitor 6/6/2023  sinus rhythm   max heart rate 150  min heart rate 37   average heart rate 61     Assessment/Plan   Mrs. Qiu is a very pleasant 79 year old female with a history of HTN, HLD and PVCs she complains of intermittent heart palpitations that are brief in nature. Monitor showed a few PVCs, she was given reassurance.  Echocardiogram shows a preserved LV function. Continues to stay active exercising on a regular basis without limitations. Heart rate and blood pressure are well controlled today.      Plan   -call with any questions   -continue Metoprolol ER 50 mg daily and Atorvastatin 20 mg daily   -continue Amlodipine 10 mg daily   -follow up as needed       Priscilla Aguirre, APRN-CNP

## 2024-11-30 LAB
ATRIAL RATE: 61 BPM
P AXIS: 30 DEGREES
P OFFSET: 176 MS
P ONSET: 109 MS
PR INTERVAL: 228 MS
Q ONSET: 223 MS
QRS COUNT: 10 BEATS
QRS DURATION: 108 MS
QT INTERVAL: 386 MS
QTC CALCULATION(BAZETT): 388 MS
QTC FREDERICIA: 387 MS
R AXIS: 6 DEGREES
T AXIS: 43 DEGREES
T OFFSET: 416 MS
VENTRICULAR RATE: 61 BPM

## 2024-12-03 ENCOUNTER — APPOINTMENT (OUTPATIENT)
Dept: CARDIOLOGY | Facility: CLINIC | Age: 79
End: 2024-12-03
Payer: MEDICARE

## 2025-01-01 DIAGNOSIS — G47.00 INSOMNIA, UNSPECIFIED TYPE: ICD-10-CM

## 2025-01-01 RX ORDER — ZOLPIDEM TARTRATE 10 MG/1
10 TABLET ORAL NIGHTLY PRN
Qty: 90 TABLET | Refills: 0 | Status: SHIPPED | OUTPATIENT
Start: 2025-01-01 | End: 2025-04-01

## 2025-01-01 NOTE — PROGRESS NOTES
I have personally reviewed the OARRS report for the patient. This report is scanned into the electronic medical record. I have considered the risks of abuse, dependence, addiction and diversion. I believe that it is clinically appropriate for the patient to be prescribed this medication.

## 2025-01-12 NOTE — PROGRESS NOTES
Urogynecology  Provider:  Christen Burnett MD  [phone]    ASSESSMENT AND PLAN:   79 year old female with a urethral caruncle presenting for an annual WWE. Comorbidities include: HTN.     Diagnoses:  #1 Well woman exam  #2 Breast cancer screening  #3 Vaginal atrophy   #4 Urethral caruncle     Plan:  1. WWE, breast cancer screening  - Normal pelvic exam today without any abnormal findings.   - Her last mammogram on 1/24/2024 that returned BI-RADS category 1 (negative) and she prefers to continue routine annual MMG.   - Bilateral mammogram with tomosynthesis requisition sent today and this order expires by 3/13/2026.     2. Urethral caruncle, vaginal atrophy  - Upon exam the urethral caruncle is stable and she is not symptomatic.   - Continue using tv Estradiol cream 2x/week to help reduce symptoms of urethral caruncle.     Follow up in 1 year with Dr. Burnett.     Scribe Attestation  By signing my name below, I, Claire Crawford, attest that this documentation has been prepared under the direction and in the presence of Christen Burnett MD on 01/13/2025 at 5:53 PM.     Agree with above. I Dr. Burnett, personally performed the services described in the documentation which was scribed virtually and confirm it is both complete and accurate.  Christen Burnett MD        Problem List Items Addressed This Visit    None  Visit Diagnoses       Urinary disorder    -  Primary    Relevant Orders    POCT UA Automated manually resulted (Completed)    Visit for screening mammogram        Relevant Orders    BI mammo bilateral screening tomosynthesis                I spent a total of eConsult Time: 15 minutes in face to face and non face to face time.        Christen Burnett MD        HISTORY OF PRESENT ILLNESS:   79 year old female presenting for a well woman visit with routine gynecologic exam.     Records Review:   - Last visit 1/2024  Diagnoses:  #1 Breast cancer screening  #2 Health maintenance, well woman  visit   #3 Urethral caruncle      Plan:  1. Breast cancer screening, well woman visit   - Normal breast and pelvic exam with unremarkable findings.   - Bilateral mammogram with tomosynthesis requisition sent today due by 3/8/2025.      2. Elevated blood pressure, HTN  - Her BP was 199/73 and repeat BP in the supine position was 162/74. She was reassured about this.   - Patient may consider follow up with her PCP to discuss antihypertensive medication management with medication and dietary changes/less sodium intake.      3. Urethral caruncle  - Her urethral caruncle is minimal in size (2mm) and not bothersome.   - not currently using tv estrogen    Follow up in 1 year with Dr. Burnett.     OBGYN History and Symptoms:  - Denies any bleeding or problems related to her known urethral caruncle.   - She is using tv estrogen cream 2x/week.   - Last mammogram on 1/24/2024 that returned BI-RADS category 1 (negative) and she prefers to continue routine annual MMG.   - No urinary issues or complaints.       Past Medical History:    Past Medical History:   Diagnosis Date    Arthritis     Hypertension Don’t know    Personal history of other diseases of the circulatory system     History of hypertension    Personal history of other diseases of the musculoskeletal system and connective tissue     History of arthritis       Past Surgical History:     Past Surgical History:   Procedure Laterality Date    OTHER SURGICAL HISTORY  10/12/2016    Parathyroid Resection    OTHER SURGICAL HISTORY  11/05/2020    Oophorectomy    OTHER SURGICAL HISTORY  06/26/2019    Colonoscopy    TONSILLECTOMY  Child    TUBAL LIGATION  08/13/2013    Tubal Ligation       Medications:     Prior to Admission medications    Medication Sig Start Date End Date Taking? Authorizing Provider   alendronate (Fosamax) 70 mg tablet TAKE 1 TABLET BY MOUTH WEEKLY  WITH 8 OZ OF PLAIN WATER 30  MINUTES BEFORE FIRST FOOD, DRINK OR MEDS. STAY UPRIGHT FOR 30  MINS 10/15/24    Christen Burnett MD   amLODIPine (Norvasc) 10 mg tablet Take 1 tablet (10 mg) by mouth once daily. 6/17/24 6/17/25  Samuel Paul MD   ascorbic acid (VITAMIN C ORAL) Take by mouth once daily. 500mg caplet 5/19/03   Historical Provider, MD   atorvastatin (Lipitor) 20 mg tablet Take 1 tablet (20 mg) by mouth once daily. 5/20/24   Samuel Paul MD   clotrimazole-betamethasone (Lotrisone) cream Apply 1 Application topically 2 times a day as needed (itching). 7/11/24 7/11/25  ANURADHA Bañuelos-CNP   coenzyme Q-10 (Co Q-10) 10 mg capsule Take 1 capsule (10 mg) by mouth once daily.    Historical Provider, MD   Denta 5000 Plus 1.1 % dental cream BRUSH TEETH NORMALLY AND BRUSH WITH TOPICAL FLOURIDE AND DO NOT RINSE OFF 2X A DAY 6/28/24   Historical Provider, MD   meloxicam (Mobic) 7.5 mg tablet Take 1 tablet (7.5 mg) by mouth once daily.  Patient not taking: Reported on 11/26/2024 9/29/24   Samuel Paul MD   metoprolol succinate XL (Toprol-XL) 50 mg 24 hr tablet Take 1 tablet (50 mg) by mouth once daily. Do not fill before July 22, 2024. 7/22/24 7/22/25  Samuel Paul MD   multivitamin tablet qd 5/19/03   Historical Provider, MD   TURMERIC ORAL Take by mouth.    Historical Provider, MD   zolpidem (Ambien) 10 mg tablet Take 1 tablet (10 mg) by mouth as needed at bedtime for sleep. 1/1/25 4/1/25  ANURADHA Benavidez-CNP        ROS  Review of Systems   Constitutional: Negative.    HENT: Negative.     Eyes: Negative.    Respiratory: Negative.     Cardiovascular: Negative.    Gastrointestinal: Negative.    Endocrine: Negative.    Genitourinary: Negative.    Musculoskeletal: Negative.    Neurological: Negative.    Psychiatric/Behavioral: Negative.          POC Blood, Urine   Date Value Ref Range Status   07/11/2024 NEGATIVE NEGATIVE Final     Poc Nitrite, Urine   Date Value Ref Range Status   07/11/2024 NEGATIVE NEGATIVE Final     POC Urobilinogen, Urine   Date Value Ref Range Status   07/11/2024 0.2 0.2, 1.0  "EU/DL Final     POC Leukocytes, Urine   Date Value Ref Range Status   07/11/2024 NEGATIVE NEGATIVE Final         PHYSICAL EXAM:    There were no vitals taken for this visit.  No LMP recorded.      Declines chaperone for physical exam.      Well developed, well nourished, in no apparent distress.   Neurologic/Psychiatric:  Awake, Alert and Oriented times 3.  Affect normal.     GENITAL/URINARY:     External Genitalia:  The patient has normal appearing external genitalia, normal skenes and bartholins glands, and a normal hair distribution.  Her vulva is without lesions, erythema or discharge.  It is non-tender with appropriate sensation.     Urethral Meatus:  Size normal, Location normal, Lesions absent, Prolapse absent.    Urethra:  Fullness absent, Masses absent. Urethral caruncle present, stable and appears unchanged in diameter/size.     Bladder:  Fullness absent, Masses absent, Tenderness absent.    Vagina:  General appearance normal, Discharge absent, Lesions absent. Moderate vaginal atrophy. No pelvic organ prolapse.     Cervix: Normal, no discharge.   Uterus:  normal size, mobile, and nontender  Adnexa: normal, no masses or tenderness over the bilateral adnexa      Anus/Perineum:  Lesions absent and masses absent. Normal appearing anus and perineum.     Stress urinary incontinence not demonstrable.         POP-Q:  Stage: 0  Position: supine lithotomy           Data and DIAGNOSTIC STUDIES REVIEWED   No results found.   No results found for: \"URINECULTURE\", \"UAMICCOMM\"   Lab Results   Component Value Date    GLUCOSE 85 09/11/2024    CALCIUM 9.9 09/11/2024     09/11/2024    K 4.7 09/11/2024    CO2 30 09/11/2024     09/11/2024    BUN 30 (H) 09/11/2024    CREATININE 0.90 09/11/2024     Lab Results   Component Value Date    WBC 5.8 09/11/2024    HGB 14.4 09/11/2024    HCT 45.0 09/11/2024    MCV 89 09/11/2024     09/11/2024          Christen Burnett MD    "

## 2025-01-13 ENCOUNTER — OFFICE VISIT (OUTPATIENT)
Dept: OBSTETRICS AND GYNECOLOGY | Facility: CLINIC | Age: 80
End: 2025-01-13
Payer: MEDICARE

## 2025-01-13 VITALS
HEART RATE: 64 BPM | BODY MASS INDEX: 31.12 KG/M2 | SYSTOLIC BLOOD PRESSURE: 137 MMHG | HEIGHT: 65 IN | WEIGHT: 186.8 LBS | DIASTOLIC BLOOD PRESSURE: 69 MMHG

## 2025-01-13 DIAGNOSIS — N39.9 URINARY DISORDER: Primary | ICD-10-CM

## 2025-01-13 DIAGNOSIS — Z12.31 VISIT FOR SCREENING MAMMOGRAM: ICD-10-CM

## 2025-01-13 LAB
POC APPEARANCE, URINE: CLEAR
POC BILIRUBIN, URINE: ABNORMAL
POC BLOOD, URINE: NEGATIVE
POC COLOR, URINE: ABNORMAL
POC GLUCOSE, URINE: NEGATIVE MG/DL
POC KETONES, URINE: NEGATIVE MG/DL
POC LEUKOCYTES, URINE: NEGATIVE
POC NITRITE,URINE: NEGATIVE
POC PH, URINE: 5.5 PH
POC PROTEIN, URINE: ABNORMAL MG/DL
POC SPECIFIC GRAVITY, URINE: >=1.03
POC UROBILINOGEN, URINE: 0.2 EU/DL

## 2025-01-13 PROCEDURE — 3078F DIAST BP <80 MM HG: CPT | Performed by: OBSTETRICS & GYNECOLOGY

## 2025-01-13 PROCEDURE — 1123F ACP DISCUSS/DSCN MKR DOCD: CPT | Performed by: OBSTETRICS & GYNECOLOGY

## 2025-01-13 PROCEDURE — 3075F SYST BP GE 130 - 139MM HG: CPT | Performed by: OBSTETRICS & GYNECOLOGY

## 2025-01-13 PROCEDURE — 99212 OFFICE O/P EST SF 10 MIN: CPT | Performed by: OBSTETRICS & GYNECOLOGY

## 2025-01-13 PROCEDURE — 81003 URINALYSIS AUTO W/O SCOPE: CPT | Performed by: OBSTETRICS & GYNECOLOGY

## 2025-01-13 PROCEDURE — 1036F TOBACCO NON-USER: CPT | Performed by: OBSTETRICS & GYNECOLOGY

## 2025-01-13 PROCEDURE — 1126F AMNT PAIN NOTED NONE PRSNT: CPT | Performed by: OBSTETRICS & GYNECOLOGY

## 2025-01-13 ASSESSMENT — ENCOUNTER SYMPTOMS
CARDIOVASCULAR NEGATIVE: 1
MUSCULOSKELETAL NEGATIVE: 1
CONSTITUTIONAL NEGATIVE: 1
RESPIRATORY NEGATIVE: 1
EYES NEGATIVE: 1
PSYCHIATRIC NEGATIVE: 1
ENDOCRINE NEGATIVE: 1
NEUROLOGICAL NEGATIVE: 1
GASTROINTESTINAL NEGATIVE: 1

## 2025-01-13 ASSESSMENT — PAIN SCALES - GENERAL: PAINLEVEL_OUTOF10: 0-NO PAIN

## 2025-01-31 ENCOUNTER — HOSPITAL ENCOUNTER (OUTPATIENT)
Dept: RADIOLOGY | Facility: CLINIC | Age: 80
Discharge: HOME | End: 2025-01-31
Payer: MEDICARE

## 2025-01-31 DIAGNOSIS — Z12.31 VISIT FOR SCREENING MAMMOGRAM: ICD-10-CM

## 2025-01-31 PROCEDURE — 77067 SCR MAMMO BI INCL CAD: CPT

## 2025-03-21 DIAGNOSIS — N90.89 LABIAL IRRITATION: ICD-10-CM

## 2025-03-21 RX ORDER — CLOTRIMAZOLE AND BETAMETHASONE DIPROPIONATE 10; .64 MG/G; MG/G
1 CREAM TOPICAL 2 TIMES DAILY PRN
Qty: 30 G | Refills: 2 | Status: SHIPPED | OUTPATIENT
Start: 2025-03-21

## 2025-03-21 NOTE — TELEPHONE ENCOUNTER
Request received for   Requested Prescriptions     Pending Prescriptions Disp Refills    clotrimazole-betamethasone (Lotrisone) cream 30 g 2     Sig: Apply 1 Application topically 2 times a day as needed (itching).       Yosvany Uriarte was last seen 1/13/2025

## 2025-04-01 DIAGNOSIS — G47.00 INSOMNIA, UNSPECIFIED TYPE: ICD-10-CM

## 2025-04-01 RX ORDER — ZOLPIDEM TARTRATE 10 MG/1
10 TABLET ORAL NIGHTLY PRN
Qty: 90 TABLET | Refills: 0 | Status: SHIPPED | OUTPATIENT
Start: 2025-04-01 | End: 2025-06-30

## 2025-04-18 ENCOUNTER — PATIENT MESSAGE (OUTPATIENT)
Dept: PRIMARY CARE | Facility: CLINIC | Age: 80
End: 2025-04-18
Payer: MEDICARE

## 2025-04-18 DIAGNOSIS — I10 HYPERTENSION, UNSPECIFIED TYPE: ICD-10-CM

## 2025-04-21 RX ORDER — METOPROLOL SUCCINATE 50 MG/1
50 TABLET, EXTENDED RELEASE ORAL DAILY
Qty: 90 TABLET | Refills: 3 | Status: SHIPPED | OUTPATIENT
Start: 2025-04-21 | End: 2026-04-21

## 2025-05-06 ENCOUNTER — TELEPHONE (OUTPATIENT)
Dept: OBSTETRICS AND GYNECOLOGY | Facility: CLINIC | Age: 80
End: 2025-05-06
Payer: MEDICARE

## 2025-05-06 DIAGNOSIS — M81.0 AGE RELATED OSTEOPOROSIS, UNSPECIFIED PATHOLOGICAL FRACTURE PRESENCE: ICD-10-CM

## 2025-05-07 RX ORDER — ALENDRONATE SODIUM 70 MG/1
70 TABLET ORAL
Qty: 12 TABLET | Refills: 3 | Status: SHIPPED | OUTPATIENT
Start: 2025-05-07 | End: 2025-05-28 | Stop reason: SDUPTHER

## 2025-05-28 ENCOUNTER — TELEPHONE (OUTPATIENT)
Dept: OBSTETRICS AND GYNECOLOGY | Facility: CLINIC | Age: 80
End: 2025-05-28
Payer: MEDICARE

## 2025-05-28 DIAGNOSIS — M81.0 AGE RELATED OSTEOPOROSIS, UNSPECIFIED PATHOLOGICAL FRACTURE PRESENCE: ICD-10-CM

## 2025-05-28 RX ORDER — ALENDRONATE SODIUM 70 MG/1
70 TABLET ORAL
Qty: 12 TABLET | Refills: 3 | Status: SHIPPED | OUTPATIENT
Start: 2025-05-28 | End: 2026-05-28

## 2025-05-28 NOTE — TELEPHONE ENCOUNTER
Pt states pharmacy never received rx.    Request received for   Requested Prescriptions     Pending Prescriptions Disp Refills    alendronate (Fosamax) 70 mg tablet 12 tablet 3     Sig: Take 1 tablet (70 mg) by mouth every 7 days. Take in the morning with a full glass of water, on an empty stomach, and do not take anything else by mouth or lie down for the next 30 min.       Yosvany Uriarte was last seen 1/13/2025

## 2025-06-16 PROBLEM — R20.0 NUMBNESS OF LOWER EXTREMITY: Status: RESOLVED | Noted: 2023-05-19 | Resolved: 2025-06-16

## 2025-06-16 PROBLEM — R00.2 PALPITATIONS: Status: RESOLVED | Noted: 2023-05-19 | Resolved: 2025-06-16

## 2025-06-18 ENCOUNTER — APPOINTMENT (OUTPATIENT)
Dept: PRIMARY CARE | Facility: CLINIC | Age: 80
End: 2025-06-18
Payer: MEDICARE

## 2025-06-18 VITALS
DIASTOLIC BLOOD PRESSURE: 72 MMHG | HEIGHT: 65 IN | SYSTOLIC BLOOD PRESSURE: 140 MMHG | HEART RATE: 61 BPM | WEIGHT: 186 LBS | OXYGEN SATURATION: 96 % | BODY MASS INDEX: 30.99 KG/M2 | TEMPERATURE: 97 F

## 2025-06-18 DIAGNOSIS — Z00.00 ROUTINE GENERAL MEDICAL EXAMINATION AT HEALTH CARE FACILITY: Primary | ICD-10-CM

## 2025-06-18 DIAGNOSIS — G47.00 INSOMNIA, UNSPECIFIED TYPE: ICD-10-CM

## 2025-06-18 DIAGNOSIS — E78.2 MIXED HYPERLIPIDEMIA: ICD-10-CM

## 2025-06-18 DIAGNOSIS — I10 PRIMARY HYPERTENSION: ICD-10-CM

## 2025-06-18 DIAGNOSIS — Z51.81 THERAPEUTIC DRUG MONITORING: ICD-10-CM

## 2025-06-18 PROCEDURE — 1159F MED LIST DOCD IN RCRD: CPT | Performed by: FAMILY MEDICINE

## 2025-06-18 PROCEDURE — 99213 OFFICE O/P EST LOW 20 MIN: CPT | Performed by: FAMILY MEDICINE

## 2025-06-18 PROCEDURE — 1160F RVW MEDS BY RX/DR IN RCRD: CPT | Performed by: FAMILY MEDICINE

## 2025-06-18 PROCEDURE — 1036F TOBACCO NON-USER: CPT | Performed by: FAMILY MEDICINE

## 2025-06-18 PROCEDURE — 3078F DIAST BP <80 MM HG: CPT | Performed by: FAMILY MEDICINE

## 2025-06-18 PROCEDURE — 1170F FXNL STATUS ASSESSED: CPT | Performed by: FAMILY MEDICINE

## 2025-06-18 PROCEDURE — 99397 PER PM REEVAL EST PAT 65+ YR: CPT | Performed by: FAMILY MEDICINE

## 2025-06-18 PROCEDURE — G0439 PPPS, SUBSEQ VISIT: HCPCS | Performed by: FAMILY MEDICINE

## 2025-06-18 PROCEDURE — 3077F SYST BP >= 140 MM HG: CPT | Performed by: FAMILY MEDICINE

## 2025-06-18 RX ORDER — ZOLPIDEM TARTRATE 10 MG/1
10 TABLET ORAL NIGHTLY PRN
Qty: 90 TABLET | Refills: 0 | Status: SHIPPED | OUTPATIENT
Start: 2025-06-18 | End: 2025-09-16

## 2025-06-18 ASSESSMENT — ACTIVITIES OF DAILY LIVING (ADL)
GROCERY_SHOPPING: INDEPENDENT
TAKING_MEDICATION: INDEPENDENT
DRESSING: INDEPENDENT
BATHING: INDEPENDENT
DOING_HOUSEWORK: INDEPENDENT
MANAGING_FINANCES: INDEPENDENT

## 2025-06-18 ASSESSMENT — PATIENT HEALTH QUESTIONNAIRE - PHQ9
SUM OF ALL RESPONSES TO PHQ9 QUESTIONS 1 AND 2: 0
2. FEELING DOWN, DEPRESSED OR HOPELESS: NOT AT ALL
1. LITTLE INTEREST OR PLEASURE IN DOING THINGS: NOT AT ALL

## 2025-06-18 ASSESSMENT — ENCOUNTER SYMPTOMS
DEPRESSION: 0
LOSS OF SENSATION IN FEET: 0
HYPERTENSION: 1
OCCASIONAL FEELINGS OF UNSTEADINESS: 0

## 2025-06-18 NOTE — ASSESSMENT & PLAN NOTE
Mild elevation in BP today but reports usually mildly elevated in office   Continue current regimen   Check labs    Orders:    Comprehensive Metabolic Panel; Future

## 2025-06-18 NOTE — ASSESSMENT & PLAN NOTE
Recommended screening guidelines addressed and orders placed as indicated by age and chronic conditions  Screening labs ordered, will call with results  Continue to work on healthy lifestyle including well balanced diet, regular activity, limit alcohol, no tobacco products   Follow up annually      Orders:    1 Year Follow Up In [facility] - PCP - Wellness Exam; Future    Comprehensive Metabolic Panel; Future    Lipid Panel; Future    CBC; Future

## 2025-06-18 NOTE — ASSESSMENT & PLAN NOTE
Continue on current regimen  [place] reviewed, UDS on file, CSA signed    Orders:    zolpidem (Ambien) 10 mg tablet; Take 1 tablet (10 mg) by mouth as needed at bedtime for sleep.

## 2025-06-18 NOTE — PROGRESS NOTES
"Subjective   Reason for Visit: Yosvany Uriarte is an 79 y.o. female here for a Medicare Wellness visit.     Past Medical, Surgical, and Family History reviewed and updated in chart.    Reviewed all medications by prescribing practitioner or clinical pharmacist (such as prescriptions, OTCs, herbal therapies and supplements) and documented in the medical record.    Hypertension      HTN: doing well with medication, reports getting anxious    Follows with urogyn, Dr. Burnett, doing well    On Fosamax for osteoporosis    Taking Ambien routinely  OARRS:  Nela Ace MD on 6/18/2025  9:14 AM  I have personally reviewed the OARRS report for Yosvany Uriarte. I have considered the risks of abuse, dependence, addiction and diversion    Is the patient prescribed a combination of a benzodiazepine and opioid?  No    Last Urine Drug Screen / ordered today: Yes  No results found for this or any previous visit (from the past 8760 hours).  N/A    Controlled Substance Agreement:  Date of the Last Agreement: 6/18/25  Reviewed Controlled Substance Agreement including but not limited to the benefits, risks, and alternatives to treatment with a Controlled Substance medication(s).    Sleep Aids:   What is the patient's goal of therapy? sleep  Is this being achieved with current treatment? yes    Activities of Daily Living:   Is your overall impression that this patient is benefiting (symptom reduction outweighs side effects) from sleep aid therapy? Yes     1. Physical Functioning: Better  2. Family Relationship: Better  3. Social Relationship: Better  4. Mood: Better  5. Sleep Patterns: Better  6. Overall Function: Better    Patient Care Team:  Nela Ace MD as PCP - General (Family Medicine)  Samuel Paul MD as PCP - [employer] PCP     Review of Systems  Negative unless noted in HPI    Objective   Vitals:  /72   Pulse 61   Temp 36.1 °C (97 °F) (Temporal)   Ht 1.651 m (5' 5\")   Wt 84.4 kg (186 lb)   SpO2 96%   " BMI 30.95 kg/m²       Physical Exam  Constitutional:       Appearance: Normal appearance.   HENT:      Head: Normocephalic and atraumatic.      Right Ear: External ear normal.      Left Ear: External ear normal.      Nose: Nose normal.   Eyes:      Extraocular Movements: Extraocular movements intact.      Conjunctiva/sclera: Conjunctivae normal.      Pupils: Pupils are equal, round, and reactive to light.   Cardiovascular:      Rate and Rhythm: Normal rate and regular rhythm.   Pulmonary:      Effort: Pulmonary effort is normal.      Breath sounds: Normal breath sounds.   Abdominal:      General: Abdomen is flat.      Palpations: Abdomen is soft.   Musculoskeletal:         General: Normal range of motion.      Cervical back: Normal range of motion and neck supple.   Skin:     General: Skin is warm.   Neurological:      General: No focal deficit present.      Mental Status: She is alert and oriented to person, place, and time.   Psychiatric:         Mood and Affect: Mood normal.         Assessment & Plan  Therapeutic drug monitoring    Orders:    Drug Screen, Urine With Reflex to Confirmation    Routine general medical examination at health care facility  Recommended screening guidelines addressed and orders placed as indicated by age and chronic conditions  Screening labs ordered, will call with results  Continue to work on healthy lifestyle including well balanced diet, regular activity, limit alcohol, no tobacco products   Follow up annually      Orders:    1 Year Follow Up In [facility] - PCP - Wellness Exam; Future    Comprehensive Metabolic Panel; Future    Lipid Panel; Future    CBC; Future    Primary hypertension  Mild elevation in BP today but reports usually mildly elevated in office   Continue current regimen   Check labs    Orders:    Comprehensive Metabolic Panel; Future    Mixed hyperlipidemia    Orders:    Lipid Panel; Future    BMI 30.0-30.9,adult    Orders:    CBC; Future    Insomnia,  unspecified type  Continue on current regimen  OARRS reviewed, UDS on file, CSA signed    Orders:    zolpidem (Ambien) 10 mg tablet; Take 1 tablet (10 mg) by mouth as needed at bedtime for sleep.

## 2025-06-19 LAB
ALBUMIN SERPL-MCNC: 4.4 G/DL (ref 3.6–5.1)
ALP SERPL-CCNC: 50 U/L (ref 37–153)
ALT SERPL-CCNC: 15 U/L (ref 6–29)
AMPHETAMINES UR QL: NEGATIVE NG/ML
ANION GAP SERPL CALCULATED.4IONS-SCNC: 10 MMOL/L (CALC) (ref 7–17)
AST SERPL-CCNC: 14 U/L (ref 10–35)
BARBITURATES UR QL: NEGATIVE NG/ML
BENZODIAZ UR QL: NEGATIVE NG/ML
BILIRUB SERPL-MCNC: 0.5 MG/DL (ref 0.2–1.2)
BUN SERPL-MCNC: 23 MG/DL (ref 7–25)
BZE UR QL: NEGATIVE NG/ML
CALCIUM SERPL-MCNC: 10.1 MG/DL (ref 8.6–10.4)
CHLORIDE SERPL-SCNC: 104 MMOL/L (ref 98–110)
CHOLEST SERPL-MCNC: 203 MG/DL
CHOLEST/HDLC SERPL: 3.7 (CALC)
CO2 SERPL-SCNC: 26 MMOL/L (ref 20–32)
CREAT SERPL-MCNC: 0.9 MG/DL (ref 0.6–0.95)
CREAT UR-MCNC: 170.5 MG/DL
EGFRCR SERPLBLD CKD-EPI 2021: 65 ML/MIN/1.73M2
ERYTHROCYTE [DISTWIDTH] IN BLOOD BY AUTOMATED COUNT: 12.6 % (ref 11–15)
FENTANYL UR QL SCN: NEGATIVE NG/ML
GLUCOSE SERPL-MCNC: 114 MG/DL (ref 65–99)
HCT VFR BLD AUTO: 46.6 % (ref 35–45)
HDLC SERPL-MCNC: 55 MG/DL
HGB BLD-MCNC: 15 G/DL (ref 11.7–15.5)
LDLC SERPL CALC-MCNC: 112 MG/DL (CALC)
MCH RBC QN AUTO: 29.2 PG (ref 27–33)
MCHC RBC AUTO-ENTMCNC: 32.2 G/DL (ref 32–36)
MCV RBC AUTO: 90.8 FL (ref 80–100)
METHADONE UR QL: NEGATIVE NG/ML
NONHDLC SERPL-MCNC: 148 MG/DL (CALC)
OPIATES UR QL: NEGATIVE NG/ML
OXIDANTS UR QL: NEGATIVE MCG/ML
OXYCODONE UR QL: NEGATIVE NG/ML
PCP UR QL: NEGATIVE NG/ML
PH UR: 5.1 [PH] (ref 4.5–9)
PLATELET # BLD AUTO: 377 THOUSAND/UL (ref 140–400)
PMV BLD REES-ECKER: 9.7 FL (ref 7.5–12.5)
POTASSIUM SERPL-SCNC: 4.5 MMOL/L (ref 3.5–5.3)
PROT SERPL-MCNC: 7.2 G/DL (ref 6.1–8.1)
QUEST NOTES AND COMMENTS: NORMAL
RBC # BLD AUTO: 5.13 MILLION/UL (ref 3.8–5.1)
SODIUM SERPL-SCNC: 140 MMOL/L (ref 135–146)
THC UR QL: NEGATIVE NG/ML
TRIGL SERPL-MCNC: 253 MG/DL
WBC # BLD AUTO: 5.2 THOUSAND/UL (ref 3.8–10.8)

## 2025-08-17 DIAGNOSIS — G47.00 INSOMNIA, UNSPECIFIED TYPE: ICD-10-CM

## 2025-08-17 RX ORDER — ZOLPIDEM TARTRATE 10 MG/1
10 TABLET ORAL NIGHTLY PRN
Qty: 90 TABLET | Refills: 0 | Status: SHIPPED | OUTPATIENT
Start: 2025-08-17 | End: 2025-08-21 | Stop reason: SDUPTHER

## 2025-08-21 ENCOUNTER — PATIENT MESSAGE (OUTPATIENT)
Dept: PRIMARY CARE | Facility: CLINIC | Age: 80
End: 2025-08-21
Payer: MEDICARE

## 2025-08-21 DIAGNOSIS — G47.00 INSOMNIA, UNSPECIFIED TYPE: ICD-10-CM

## 2025-08-21 DIAGNOSIS — I10 HYPERTENSION, UNSPECIFIED TYPE: ICD-10-CM

## 2025-08-21 RX ORDER — AMLODIPINE BESYLATE 10 MG/1
10 TABLET ORAL DAILY
Qty: 90 TABLET | Refills: 3 | Status: SHIPPED | OUTPATIENT
Start: 2025-08-21 | End: 2026-08-21

## 2025-08-21 RX ORDER — ZOLPIDEM TARTRATE 10 MG/1
10 TABLET ORAL NIGHTLY PRN
Qty: 90 TABLET | Refills: 0 | Status: SHIPPED | OUTPATIENT
Start: 2025-08-21

## 2026-06-19 ENCOUNTER — APPOINTMENT (OUTPATIENT)
Dept: PRIMARY CARE | Facility: CLINIC | Age: 81
End: 2026-06-19
Payer: MEDICARE